# Patient Record
Sex: FEMALE | Race: WHITE | NOT HISPANIC OR LATINO | Employment: FULL TIME | URBAN - METROPOLITAN AREA
[De-identification: names, ages, dates, MRNs, and addresses within clinical notes are randomized per-mention and may not be internally consistent; named-entity substitution may affect disease eponyms.]

---

## 2018-06-25 DIAGNOSIS — J32.0 MAXILLARY SINUSITIS, UNSPECIFIED CHRONICITY: Primary | ICD-10-CM

## 2018-06-25 DIAGNOSIS — R05.9 COUGH: ICD-10-CM

## 2018-06-25 RX ORDER — AMOXICILLIN 875 MG/1
875 TABLET, COATED ORAL 2 TIMES DAILY
Qty: 20 TABLET | Refills: 0 | Status: SHIPPED | OUTPATIENT
Start: 2018-06-25 | End: 2018-07-05

## 2018-06-25 RX ORDER — BENZONATATE 200 MG/1
200 CAPSULE ORAL 3 TIMES DAILY PRN
Qty: 20 CAPSULE | Refills: 0 | Status: SHIPPED | OUTPATIENT
Start: 2018-06-25 | End: 2019-07-23

## 2018-06-25 RX ORDER — PROMETHAZINE HYDROCHLORIDE AND CODEINE PHOSPHATE 6.25; 1 MG/5ML; MG/5ML
5 SYRUP ORAL EVERY 4 HOURS PRN
Qty: 100 ML | Refills: 0 | Status: SHIPPED | OUTPATIENT
Start: 2018-06-25 | End: 2019-07-23

## 2019-07-23 ENCOUNTER — OFFICE VISIT (OUTPATIENT)
Dept: FAMILY MEDICINE CLINIC | Facility: CLINIC | Age: 54
End: 2019-07-23
Payer: COMMERCIAL

## 2019-07-23 VITALS
HEART RATE: 84 BPM | TEMPERATURE: 97.7 F | RESPIRATION RATE: 16 BRPM | WEIGHT: 192 LBS | BODY MASS INDEX: 34.02 KG/M2 | SYSTOLIC BLOOD PRESSURE: 134 MMHG | DIASTOLIC BLOOD PRESSURE: 90 MMHG | HEIGHT: 63 IN

## 2019-07-23 DIAGNOSIS — R05.9 COUGH: Primary | ICD-10-CM

## 2019-07-23 PROCEDURE — 1036F TOBACCO NON-USER: CPT | Performed by: FAMILY MEDICINE

## 2019-07-23 PROCEDURE — 3008F BODY MASS INDEX DOCD: CPT | Performed by: FAMILY MEDICINE

## 2019-07-23 PROCEDURE — 99202 OFFICE O/P NEW SF 15 MIN: CPT | Performed by: FAMILY MEDICINE

## 2019-07-23 NOTE — PROGRESS NOTES
Chief Complaint   Patient presents with    URI        Patient ID: Maricel Morillo is a 48 y o  female  HPI  Pt is seeing for residual cough x 3 wks -  started with URI symptoms missed 3 days of work initially - tried Sakti3 meds with minimal help, no Sob or wheezing, no fever     The following portions of the patient's history were reviewed and updated as appropriate: allergies, current medications, past family history, past medical history, past social history, past surgical history and problem list     Review of Systems   Constitutional: Negative  HENT: Negative  Respiratory: Positive for cough  Negative for chest tightness, shortness of breath and wheezing  Cardiovascular: Negative  Gastrointestinal: Negative  Genitourinary: Negative  No current outpatient medications on file  No current facility-administered medications for this visit  Objective:    /90 (BP Location: Right arm, Patient Position: Sitting, Cuff Size: Large)   Pulse 84   Temp 97 7 °F (36 5 °C) (Tympanic)   Resp 16   Ht 5' 3" (1 6 m)   Wt 87 1 kg (192 lb)   BMI 34 01 kg/m²        Physical Exam   Constitutional: No distress  Obese    HENT:   Mouth/Throat: No oropharyngeal exudate  Eyes: Conjunctivae are normal    Cardiovascular: Normal rate  Pulmonary/Chest: Effort normal and breath sounds normal  No respiratory distress  She has no wheezes  She has no rales                 Assessment/Plan:         Diagnoses and all orders for this visit:    Cough      improving    rto for CPE                       Radha Desai MD

## 2019-07-23 NOTE — LETTER
July 23, 2019     Patient: Yanely Singh   YOB: 1965   Date of Visit: 7/23/2019       To Whom it May Concern:    Yanely Singh is under my professional care  She was seen in my office on 7/23/2019  Patient was out  Work from 7/1/2019 to 7/3/2019 for the same reason  If you have any questions or concerns, please don't hesitate to call           Sincerely,          Patrick Torrez MD        CC: No Recipients

## 2019-10-17 DIAGNOSIS — R03.0 ELEVATED BLOOD PRESSURE READING: Primary | ICD-10-CM

## 2019-10-17 RX ORDER — TRIAMTERENE AND HYDROCHLOROTHIAZIDE 37.5; 25 MG/1; MG/1
1 CAPSULE ORAL EVERY MORNING
Qty: 30 CAPSULE | Refills: 0 | Status: SHIPPED | OUTPATIENT
Start: 2019-10-17 | End: 2019-11-08 | Stop reason: SDUPTHER

## 2019-11-08 ENCOUNTER — OFFICE VISIT (OUTPATIENT)
Dept: FAMILY MEDICINE CLINIC | Facility: CLINIC | Age: 54
End: 2019-11-08
Payer: COMMERCIAL

## 2019-11-08 VITALS
WEIGHT: 196.8 LBS | BODY MASS INDEX: 36.22 KG/M2 | HEIGHT: 62 IN | TEMPERATURE: 97.8 F | SYSTOLIC BLOOD PRESSURE: 138 MMHG | HEART RATE: 80 BPM | DIASTOLIC BLOOD PRESSURE: 92 MMHG | RESPIRATION RATE: 18 BRPM
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
## 2019-11-08 DIAGNOSIS — Z11.59 NEED FOR HEPATITIS C SCREENING TEST: ICD-10-CM

## 2019-11-08 DIAGNOSIS — R03.0 ELEVATED BLOOD PRESSURE READING: ICD-10-CM

## 2019-11-08 DIAGNOSIS — B36.9 FUNGAL DERMATITIS: ICD-10-CM

## 2019-11-08 DIAGNOSIS — E78.5 BORDERLINE HYPERLIPIDEMIA: ICD-10-CM

## 2019-11-08 DIAGNOSIS — Z00.00 PHYSICAL EXAM: Primary | ICD-10-CM

## 2019-11-08 LAB
SL AMB  POCT GLUCOSE, UA: NORMAL
SL AMB LEUKOCYTE ESTERASE,UA: NORMAL
SL AMB POCT BILIRUBIN,UA: NORMAL
SL AMB POCT BLOOD,UA: NORMAL
SL AMB POCT CLARITY,UA: NORMAL
SL AMB POCT COLOR,UA: YELLOW
SL AMB POCT HEMOGLOBIN AIC: 5.3 (ref ?–6.5)
SL AMB POCT KETONES,UA: NORMAL
SL AMB POCT NITRITE,UA: NORMAL
SL AMB POCT PH,UA: 6.5
SL AMB POCT SPECIFIC GRAVITY,UA: 1
SL AMB POCT URINE PROTEIN: NORMAL
SL AMB POCT UROBILINOGEN: NORMAL

## 2019-11-08 PROCEDURE — 99396 PREV VISIT EST AGE 40-64: CPT | Performed by: FAMILY MEDICINE

## 2019-11-08 PROCEDURE — 81003 URINALYSIS AUTO W/O SCOPE: CPT | Performed by: FAMILY MEDICINE

## 2019-11-08 PROCEDURE — 3044F HG A1C LEVEL LT 7.0%: CPT | Performed by: FAMILY MEDICINE

## 2019-11-08 PROCEDURE — 83036 HEMOGLOBIN GLYCOSYLATED A1C: CPT | Performed by: FAMILY MEDICINE

## 2019-11-08 RX ORDER — TRIAMTERENE AND HYDROCHLOROTHIAZIDE 37.5; 25 MG/1; MG/1
1 CAPSULE ORAL EVERY MORNING
Qty: 30 CAPSULE | Refills: 0 | Status: SHIPPED | OUTPATIENT
Start: 2019-11-08 | End: 2019-11-30 | Stop reason: SDUPTHER

## 2019-11-08 RX ORDER — CLOTRIMAZOLE AND BETAMETHASONE DIPROPIONATE 10; .64 MG/G; MG/G
CREAM TOPICAL 2 TIMES DAILY
Qty: 30 G | Refills: 1 | Status: SHIPPED | OUTPATIENT
Start: 2019-11-08 | End: 2020-06-17 | Stop reason: ALTCHOICE

## 2019-11-08 NOTE — PATIENT INSTRUCTIONS
Hypertension   WHAT YOU NEED TO KNOW:   Hypertension is high blood pressure (BP)  Your BP is the force of your blood moving against the walls of your arteries  Normal BP is less than 120/80  Prehypertension is between 120/80 and 139/89  Hypertension is 140/90 or higher  Hypertension causes your BP to get so high that your heart has to work much harder than normal  This can damage your heart  You can control hypertension with a healthy lifestyle or medicines  A controlled blood pressure helps protect your organs, such as your heart, lungs, brain, and kidneys  DISCHARGE INSTRUCTIONS:   Call 911 for any of the following:   · You have discomfort in your chest that feels like squeezing, pressure, fullness, or pain  · You become confused or have difficulty speaking  · You suddenly feel lightheaded or have trouble breathing  · You have pain or discomfort in your back, neck, jaw, stomach, or arm  Return to the emergency department if:   · You have a severe headache or vision loss  · You have weakness in an arm or leg  Contact your healthcare provider if:   · You feel faint, dizzy, confused, or drowsy  · You have been taking your BP medicine and your BP is still higher than your healthcare provider says it should be  · You have questions or concerns about your condition or care  Medicines: You may  need any of the following:  · Medicine  may be used to help lower your BP  You may need more than one type of medicine  Take the medicine exactly as directed  · Diuretics  help decrease extra fluid that collects in your body  This will help lower your BP  You may urinate more often while you take this medicine  · Cholesterol medicine  helps lower your cholesterol level  A low cholesterol level helps prevent heart disease and makes it easier to control your blood pressure  · Take your medicine as directed    Contact your healthcare provider if you think your medicine is not helping or if you have side effects  Tell him or her if you are allergic to any medicine  Keep a list of the medicines, vitamins, and herbs you take  Include the amounts, and when and why you take them  Bring the list or the pill bottles to follow-up visits  Carry your medicine list with you in case of an emergency  Follow up with your healthcare provider as directed: You will need to return to have your BP checked and to have other lab tests done  Write down your questions so you remember to ask them during your visits  Manage hypertension:  Talk with your healthcare provider about these and other ways to manage hypertension:  · Check your BP at home  Sit and rest for 5 minutes before you take your BP  Extend your arm and support it on a flat surface  Your arm should be at the same level as your heart  Follow the directions that came with your BP monitor  If possible, take at least 2 BP readings each time  Take your BP at least twice a day at the same times each day, such as morning and evening  Keep a record of your BP readings and bring it to your follow-up visits  Ask your healthcare provider what your BP should be  · Limit sodium (salt) as directed  Too much sodium can affect your fluid balance  Check labels to find low-sodium or no-salt-added foods  Some low-sodium foods use potassium salts for flavor  Too much potassium can also cause health problems  Your healthcare provider will tell you how much sodium and potassium are safe for you to have in a day  He or she may recommend that you limit sodium to 2,300 mg a day  · Follow the meal plan recommended by your healthcare provider  A dietitian or your provider can give you more information on low-sodium plans or the DASH (Dietary Approaches to Stop Hypertension) eating plan  The DASH plan is low in sodium, unhealthy fats, and total fat  It is high in potassium, calcium, and fiber  · Exercise to maintain a healthy weight    Exercise at least 30 minutes per day, on most days of the week  This will help decrease your blood pressure  Ask your healthcare provider about the best exercise plan for you  · Decrease stress  This may help lower your BP  Learn ways to relax, such as deep breathing or listening to music  · Limit alcohol  Women should limit alcohol to 1 drink a day  Men should limit alcohol to 2 drinks a day  A drink of alcohol is 12 ounces of beer, 5 ounces of wine, or 1½ ounces of liquor  · Do not smoke  Nicotine and other chemicals in cigarettes and cigars can increase your BP and also cause lung damage  Ask your healthcare provider for information if you currently smoke and need help to quit  E-cigarettes or smokeless tobacco still contain nicotine  Talk to your healthcare provider before you use these products  · Manage any other health conditions you have  Health conditions such as diabetes can increase your risk for hypertension  Follow your healthcare provider's instructions and take all your medicines as directed  © 2017 [employer] Information is for End User's use only and may not be sold, redistributed or otherwise used for commercial purposes  All illustrations and images included in CareNotes® are the copyrighted property of [employer] , [employer]  or [employer]  The above information is an  only  It is not intended as medical advice for individual conditions or treatments  Talk to your doctor, nurse or pharmacist before following any medical regimen to see if it is safe and effective for you

## 2019-11-10 NOTE — ASSESSMENT & PLAN NOTE
BMI Counseling: Body mass index is 36 kg/m²  Discussed the patient's BMI with her  The BMI is above normal  Nutrition recommendations include reducing portion sizes, decreasing overall calorie intake, 3-5 servings of fruits/vegetables daily, reducing fast food intake, consuming healthier snacks and decreasing soda and/or juice intake  Exercise recommendations include exercising 3-5 times per week and strength training exercises

## 2019-11-10 NOTE — PROGRESS NOTES
[facility] HEALTH MAINTENANCE OFFICE VISIT  [facility] - [facility]    NAME: Ash Hernandez  AGE: 47 y o  SEX: female  : 1965     DATE: 11/10/2019    Assessment and Plan     1  Physical exam  -     POCT urine dip auto non-scope  -     Amylase; Future  -     Lipase; Future  -     Lipid Panel with Direct LDL reflex; Future  -     CBC; Future  -     Comprehensive metabolic panel; Future  -     Hepatitis C antibody; Future  -     Magnesium; Future  -     TSH, 3rd generation; Future  -     Lyme Antibody Profile with reflex to WB; Future  -     Babesia microti antibody, IgG & Igm; Future  -     POCT hemoglobin A1c    2  Elevated blood pressure reading  Comments:  rx Dyazide, low sodium diet, limit caffeine/nsaid and decongestant use  check labs, sched yearly eye exam,cont t monitor  Orders:  -     POCT urine dip auto non-scope  -     triamterene-hydrochlorothiazide (DYAZIDE) 37 5-25 mg per capsule; Take 1 capsule by mouth every morning  -     Amylase; Future  -     Lipase; Future  -     CBC; Future  -     Comprehensive metabolic panel; Future  -     Magnesium; Future  -     POCT hemoglobin A1c    3  Borderline hyperlipidemia  Comments:  check lipid panel and thyroid fxn, maintain low chol diet  Orders:  -     Amylase; Future  -     Lipase; Future  -     Lipid Panel with Direct LDL reflex; Future  -     CBC; Future  -     Comprehensive metabolic panel; Future  -     TSH, 3rd generation; Future    4  Need for hepatitis C screening test  Comments:  check hep c ab  Orders:  -     Hepatitis C antibody; Future    5  Tick bite, sequela  Comments:  check Lyme and Babesia titers  Orders:  -     Lyme Antibody Profile with reflex to WB; Future  -     Babesia microti antibody, IgG & Igm; Future    6   Fungal dermatitis  Comments:  rx Lotrisone cream, otc Goldbonds powder--call if nystatin powder needed  Orders:  -     clotrimazole-betamethasone (LOTRISONE) 1-0 05 % cream; Apply topically 2 (two) times a day    7  BMI 36 0-36 9,adult  Assessment & Plan:  BMI Counseling: Body mass index is 36 kg/m²  Discussed the patient's BMI with her  The BMI is above normal  Nutrition recommendations include reducing portion sizes, decreasing overall calorie intake, 3-5 servings of fruits/vegetables daily, reducing fast food intake, consuming healthier snacks and decreasing soda and/or juice intake  Exercise recommendations include exercising 3-5 times per week and strength training exercises  · Patient Counseling:   · Nutrition: Stressed importance of a well balanced diet, moderation of sodium/saturated fat, caloric balance and sufficient intake of fiber  · Exercise: Stressed the importance of regular exercise with a goal of 150 minutes per week  · Dental Health: Discussed daily flossing and brushing and regular dental visits   · Injury Prevention: Discussed Safety Belts, Safety Helmets, and Smoke Detectors    · Immunizations reviewed: Risks and Benefits discussed  · Discussed benefits of:  Colon Cancer Screening, Mammogram , Cervical Cancer screening and Screening labs   BMI Counseling: Body mass index is 36 kg/m²  Discussed with patient's BMI with her  The BMI is above normal  Nutrition recommendations include reducing portion sizes, decreasing overall calorie intake, 3-5 servings of fruits/vegetables daily, reducing fast food intake, consuming healthier snacks, decreasing soda and/or juice intake, moderation in carbohydrate intake and increasing intake of lean protein  Exercise recommendations include exercising 3-5 times per week and strength training exercises  No follow-ups on file          Chief Complaint     Chief Complaint   Patient presents with    Physical Exam       History of Present Illness     HPI    Well Adult Physical   Patient here for a comprehensive physical exam       Diet and Physical Activity  Diet: well balanced diet  Exercise: several times per week Depression Screen  PHQ-9 Depression Screening    PHQ-9:    Frequency of the following problems over the past two weeks:               General Health  Hearing: Normal:  bilateral  Vision: wears glasses  Dental: regular dental visits    Reproductive Health  Follows with gynecologist  Dr Luis dias breast u/s wnl 2019;ania utd-wnl    The following portions of the patient's history were reviewed and updated as appropriate: allergies, current medications, past family history, past medical history, past social history, past surgical history and problem list     Review of Systems     Review of Systems   Constitutional: Positive for fatigue  Negative for fever  Eyes: Positive for visual disturbance  Respiratory: Negative  Cardiovascular: Negative  Gastrointestinal: Negative  Musculoskeletal: Positive for arthralgias and myalgias  Skin: Positive for rash  Allergic/Immunologic: Positive for environmental allergies  Neurological: Positive for headaches  Psychiatric/Behavioral: Positive for sleep disturbance  The patient is nervous/anxious  Past Medical History     History reviewed  No pertinent past medical history      Past Surgical History     Past Surgical History:   Procedure Laterality Date    ANTERIOR CRUCIATE LIGAMENT REPAIR       SECTION         Social History     Social History     Socioeconomic History    Marital status: /Civil Union     Spouse name: None    Number of children: None    Years of education: None    Highest education level: None   Occupational History    None   Social Needs    Financial resource strain: None    Food insecurity:     Worry: None     Inability: None    Transportation needs:     Medical: None     Non-medical: None   Tobacco Use    Smoking status: Never Smoker    Smokeless tobacco: Never Used   Substance and Sexual Activity    Alcohol use: Yes     Comment: occasional    Drug use: Never    Sexual activity: Yes Partners: Male   Lifestyle    Physical activity:     Days per week: None     Minutes per session: None    Stress: None   Relationships    Social connections:     Talks on phone: None     Gets together: None     Attends Zoroastrianism service: None     Active member of club or organization: None     Attends meetings of clubs or organizations: None     Relationship status: None    Intimate partner violence:     Fear of current or ex partner: None     Emotionally abused: None     Physically abused: None     Forced sexual activity: None   Other Topics Concern    None   Social History Narrative    None       Family History     Family History   Problem Relation Age of Onset    Heart disease Father     Diabetes Sister     Diabetes Brother        Current Medications       Current Outpatient Medications:     triamterene-hydrochlorothiazide (DYAZIDE) 37 5-25 mg per capsule, Take 1 capsule by mouth every morning, Disp: 30 capsule, Rfl: 0    clotrimazole-betamethasone (LOTRISONE) 1-0 05 % cream, Apply topically 2 (two) times a day, Disp: 30 g, Rfl: 1     Allergies     No Known Allergies    Objective     /92 (BP Location: Left arm, Patient Position: Sitting, Cuff Size: Large)   Pulse 80   Temp 97 8 °F (36 6 °C)   Resp 18   Ht 5' 2" (1 575 m)   Wt 89 3 kg (196 lb 12 8 oz)   BMI 36 00 kg/m²      Physical Exam   Constitutional: She is oriented to person, place, and time  OW, NAD   HENT:   Head: Normocephalic and atraumatic  Nose: Nose normal    Mouth/Throat: No oropharyngeal exudate  Eyes: Conjunctivae are normal  No scleral icterus  Neck: Neck supple  Cardiovascular: Normal rate, regular rhythm, normal heart sounds and intact distal pulses  Pulmonary/Chest: Effort normal and breath sounds normal  No respiratory distress  Abdominal: Soft  Bowel sounds are normal  There is no tenderness  Musculoskeletal: Normal range of motion     Neurological: She is alert and oriented to person, place, and time  A cranial nerve deficit is present  Skin: Skin is warm and dry  Capillary refill takes less than 2 seconds  Rash (below breasts-red,moist, papular) noted  Psychiatric: She has a normal mood and affect  Her behavior is normal  Judgment and thought content normal    Nursing note and vitals reviewed           Visual Acuity Screening    Right eye Left eye Both eyes   Without correction:      With correction: 25/25 20/50 20/25           Dara Ya MD  [address]

## 2019-11-13 LAB
ALBUMIN SERPL-MCNC: 4.5 G/DL (ref 3.5–5.5)
ALBUMIN/GLOB SERPL: 1.6 {RATIO} (ref 1.2–2.2)
ALP SERPL-CCNC: 67 IU/L (ref 39–117)
ALT SERPL-CCNC: 61 IU/L (ref 0–32)
AMYLASE SERPL-CCNC: 37 U/L (ref 31–124)
AST SERPL-CCNC: 43 IU/L (ref 0–40)
B BURGDOR IGG+IGM SER-ACNC: <0.91 ISR (ref 0–0.9)
B MICROTI IGG TITR SER: NORMAL {TITER}
B MICROTI IGM TITR SER: NORMAL {TITER}
BASOPHILS # BLD AUTO: 0 X10E3/UL (ref 0–0.2)
BASOPHILS NFR BLD AUTO: 1 %
BILIRUB SERPL-MCNC: 0.4 MG/DL (ref 0–1.2)
BUN SERPL-MCNC: 21 MG/DL (ref 6–24)
BUN/CREAT SERPL: 25 (ref 9–23)
CALCIUM SERPL-MCNC: 9.5 MG/DL (ref 8.7–10.2)
CHLORIDE SERPL-SCNC: 99 MMOL/L (ref 96–106)
CHOLEST SERPL-MCNC: 276 MG/DL (ref 100–199)
CO2 SERPL-SCNC: 22 MMOL/L (ref 20–29)
CREAT SERPL-MCNC: 0.84 MG/DL (ref 0.57–1)
EOSINOPHIL # BLD AUTO: 0.1 X10E3/UL (ref 0–0.4)
EOSINOPHIL NFR BLD AUTO: 3 %
ERYTHROCYTE [DISTWIDTH] IN BLOOD BY AUTOMATED COUNT: 13.2 % (ref 12.3–15.4)
GLOBULIN SER-MCNC: 2.8 G/DL (ref 1.5–4.5)
GLUCOSE SERPL-MCNC: 100 MG/DL (ref 65–99)
HCT VFR BLD AUTO: 44.1 % (ref 34–46.6)
HCV AB S/CO SERPL IA: <0.1 S/CO RATIO (ref 0–0.9)
HDLC SERPL-MCNC: 60 MG/DL
HGB BLD-MCNC: 15 G/DL (ref 11.1–15.9)
IMM GRANULOCYTES # BLD: 0 X10E3/UL (ref 0–0.1)
IMM GRANULOCYTES NFR BLD: 0 %
LDLC SERPL CALC-MCNC: 188 MG/DL (ref 0–99)
LIPASE SERPL-CCNC: 22 U/L (ref 14–72)
LYMPHOCYTES # BLD AUTO: 2 X10E3/UL (ref 0.7–3.1)
LYMPHOCYTES NFR BLD AUTO: 44 %
MAGNESIUM SERPL-MCNC: 2.2 MG/DL (ref 1.6–2.3)
MCH RBC QN AUTO: 29.6 PG (ref 26.6–33)
MCHC RBC AUTO-ENTMCNC: 34 G/DL (ref 31.5–35.7)
MCV RBC AUTO: 87 FL (ref 79–97)
MICRODELETION SYND BLD/T FISH: NORMAL
MONOCYTES # BLD AUTO: 0.3 X10E3/UL (ref 0.1–0.9)
MONOCYTES NFR BLD AUTO: 7 %
NEUTROPHILS # BLD AUTO: 2 X10E3/UL (ref 1.4–7)
NEUTROPHILS NFR BLD AUTO: 45 %
PLATELET # BLD AUTO: 279 X10E3/UL (ref 150–450)
POTASSIUM SERPL-SCNC: 4.1 MMOL/L (ref 3.5–5.2)
PROT SERPL-MCNC: 7.3 G/DL (ref 6–8.5)
RBC # BLD AUTO: 5.07 X10E6/UL (ref 3.77–5.28)
SL AMB EGFR AFRICAN AMERICAN: 91 ML/MIN/1.73
SL AMB EGFR NON AFRICAN AMERICAN: 79 ML/MIN/1.73
SODIUM SERPL-SCNC: 138 MMOL/L (ref 134–144)
TRIGL SERPL-MCNC: 142 MG/DL (ref 0–149)
TSH SERPL DL<=0.005 MIU/L-ACNC: 3.11 UIU/ML (ref 0.45–4.5)
WBC # BLD AUTO: 4.5 X10E3/UL (ref 3.4–10.8)

## 2019-11-16 DIAGNOSIS — E78.01 FAMILIAL HYPERCHOLESTEROLEMIA: ICD-10-CM

## 2019-11-16 DIAGNOSIS — R03.0 ELEVATED BLOOD PRESSURE READING: Primary | ICD-10-CM

## 2019-11-16 DIAGNOSIS — R94.5 ABNORMAL RESULTS OF LIVER FUNCTION STUDIES: ICD-10-CM

## 2019-11-21 DIAGNOSIS — J40 BRONCHITIS: Primary | ICD-10-CM

## 2019-11-21 DIAGNOSIS — J01.00 ACUTE MAXILLARY SINUSITIS, RECURRENCE NOT SPECIFIED: ICD-10-CM

## 2019-11-21 RX ORDER — BENZONATATE 200 MG/1
200 CAPSULE ORAL 3 TIMES DAILY PRN
Qty: 15 CAPSULE | Refills: 0 | Status: SHIPPED | OUTPATIENT
Start: 2019-11-21 | End: 2019-11-26

## 2019-11-21 RX ORDER — AMOXICILLIN AND CLAVULANATE POTASSIUM 875; 125 MG/1; MG/1
1 TABLET, FILM COATED ORAL EVERY 12 HOURS SCHEDULED
Qty: 14 TABLET | Refills: 0 | Status: SHIPPED | OUTPATIENT
Start: 2019-11-21 | End: 2019-11-28

## 2019-11-25 ENCOUNTER — OFFICE VISIT (OUTPATIENT)
Dept: FAMILY MEDICINE CLINIC | Facility: CLINIC | Age: 54
End: 2019-11-25
Payer: COMMERCIAL

## 2019-11-25 ENCOUNTER — APPOINTMENT (OUTPATIENT)
Dept: LAB | Facility: CLINIC | Age: 54
End: 2019-11-25
Payer: COMMERCIAL

## 2019-11-25 ENCOUNTER — APPOINTMENT (OUTPATIENT)
Dept: RADIOLOGY | Facility: CLINIC | Age: 54
End: 2019-11-25
Payer: COMMERCIAL

## 2019-11-25 VITALS
OXYGEN SATURATION: 96 % | DIASTOLIC BLOOD PRESSURE: 84 MMHG | RESPIRATION RATE: 18 BRPM | HEIGHT: 62 IN | SYSTOLIC BLOOD PRESSURE: 126 MMHG | WEIGHT: 190.8 LBS | HEART RATE: 82 BPM | TEMPERATURE: 98.6 F | BODY MASS INDEX: 35.11 KG/M2

## 2019-11-25 DIAGNOSIS — R94.5 ABNORMAL RESULTS OF LIVER FUNCTION STUDIES: ICD-10-CM

## 2019-11-25 DIAGNOSIS — R53.82 CHRONIC FATIGUE: ICD-10-CM

## 2019-11-25 DIAGNOSIS — Z57.5 OCCUPATIONAL EXPOSURE TO INDUSTRIAL TOXINS: ICD-10-CM

## 2019-11-25 DIAGNOSIS — R94.5 ABNORMAL RESULTS OF LIVER FUNCTION STUDIES: Primary | ICD-10-CM

## 2019-11-25 DIAGNOSIS — E78.5 HYPERLIPIDEMIA, UNSPECIFIED HYPERLIPIDEMIA TYPE: ICD-10-CM

## 2019-11-25 DIAGNOSIS — J18.9 PNEUMONIA, UNSPECIFIED ORGANISM: Primary | ICD-10-CM

## 2019-11-25 DIAGNOSIS — R05.9 COUGH: ICD-10-CM

## 2019-11-25 DIAGNOSIS — R03.0 ELEVATED BLOOD PRESSURE READING: ICD-10-CM

## 2019-11-25 PROCEDURE — 71046 X-RAY EXAM CHEST 2 VIEWS: CPT

## 2019-11-25 PROCEDURE — 99214 OFFICE O/P EST MOD 30 MIN: CPT | Performed by: FAMILY MEDICINE

## 2019-11-25 RX ORDER — LEVOFLOXACIN 500 MG/1
500 TABLET, FILM COATED ORAL EVERY 24 HOURS
Qty: 7 TABLET | Refills: 0 | Status: SHIPPED | OUTPATIENT
Start: 2019-11-25 | End: 2019-12-02

## 2019-11-25 SDOH — HEALTH STABILITY - PHYSICAL HEALTH: OCCUPATIONAL EXPOSURE TO TOXIC AGENTS IN OTHER INDUSTRIES: Z57.5

## 2019-11-26 ENCOUNTER — VBI (OUTPATIENT)
Dept: ADMINISTRATIVE | Facility: OTHER | Age: 54
End: 2019-11-26

## 2019-11-30 DIAGNOSIS — R03.0 ELEVATED BLOOD PRESSURE READING: ICD-10-CM

## 2019-11-30 LAB
BACTERIA SPT CULT: NORMAL
GRAM STN SPT: NORMAL
Lab: NORMAL
SQUAMOUS SPT QL MICRO: NORMAL
WBC SPT QL MICRO: NORMAL

## 2019-11-30 RX ORDER — TRIAMTERENE AND HYDROCHLOROTHIAZIDE 37.5; 25 MG/1; MG/1
1 CAPSULE ORAL EVERY MORNING
Qty: 90 CAPSULE | Refills: 1 | Status: SHIPPED | OUTPATIENT
Start: 2019-11-30 | End: 2020-06-17 | Stop reason: SDUPTHER

## 2019-12-02 PROBLEM — R94.5 ABNORMAL RESULTS OF LIVER FUNCTION STUDIES: Status: ACTIVE | Noted: 2019-12-02

## 2019-12-02 PROBLEM — R53.82 CHRONIC FATIGUE: Status: ACTIVE | Noted: 2019-12-02

## 2019-12-02 PROBLEM — J18.9 PNEUMONIA, UNSPECIFIED ORGANISM: Status: ACTIVE | Noted: 2019-12-02

## 2019-12-02 PROBLEM — Z57.5: Status: ACTIVE | Noted: 2019-12-02

## 2019-12-03 ENCOUNTER — APPOINTMENT (OUTPATIENT)
Dept: RADIOLOGY | Facility: CLINIC | Age: 54
End: 2019-12-03
Payer: COMMERCIAL

## 2019-12-03 ENCOUNTER — OFFICE VISIT (OUTPATIENT)
Dept: FAMILY MEDICINE CLINIC | Facility: CLINIC | Age: 54
End: 2019-12-03
Payer: COMMERCIAL

## 2019-12-03 ENCOUNTER — VBI (OUTPATIENT)
Dept: FAMILY MEDICINE CLINIC | Facility: CLINIC | Age: 54
End: 2019-12-03

## 2019-12-03 VITALS
TEMPERATURE: 98 F | SYSTOLIC BLOOD PRESSURE: 136 MMHG | WEIGHT: 194 LBS | HEART RATE: 64 BPM | DIASTOLIC BLOOD PRESSURE: 82 MMHG | BODY MASS INDEX: 35.7 KG/M2 | RESPIRATION RATE: 16 BRPM | HEIGHT: 62 IN | OXYGEN SATURATION: 97 %

## 2019-12-03 DIAGNOSIS — R53.82 CHRONIC FATIGUE: ICD-10-CM

## 2019-12-03 DIAGNOSIS — R94.5 ABNORMAL RESULTS OF LIVER FUNCTION STUDIES: ICD-10-CM

## 2019-12-03 DIAGNOSIS — J18.9 PNEUMONIA, UNSPECIFIED ORGANISM: ICD-10-CM

## 2019-12-03 DIAGNOSIS — R79.0 ABNORMAL BLOOD LEVEL OF COPPER: ICD-10-CM

## 2019-12-03 DIAGNOSIS — J18.9 PNEUMONIA, UNSPECIFIED ORGANISM: Primary | ICD-10-CM

## 2019-12-03 LAB
ANTIMONY BLD-MCNC: 4.4 MCG/L
ARSENIC BLD-MCNC: 5 UG/L (ref 2–23)
BASOPHILS # BLD AUTO: 0 X10E3/UL (ref 0–0.2)
BASOPHILS NFR BLD AUTO: 0 %
BISMUTH [MASS/VOLUME] IN BLOOD: NORMAL UG/L (ref 0–9)
COBALT SERPL-MCNC: NORMAL UG/L (ref 0–0.9)
COPPER SERPL-MCNC: 179 UG/DL (ref 72–166)
CR RBC-MCNC: NORMAL MCG/L
CR SERPL-MCNC: 1 UG/L (ref 0.1–2.1)
EOSINOPHIL # BLD AUTO: 0 X10E3/UL (ref 0–0.4)
EOSINOPHIL NFR BLD AUTO: 0 %
ERYTHROCYTE [DISTWIDTH] IN BLOOD BY AUTOMATED COUNT: 13.4 % (ref 12.3–15.4)
FORMALDEHYDE IGE AB [UNITS/VOLUME] IN SERUM: <0.1 KU/L
HCT VFR BLD AUTO: 39.4 % (ref 34–46.6)
HGB BLD-MCNC: 13.1 G/DL (ref 11.1–15.9)
IMM GRANULOCYTES # BLD: 0 X10E3/UL (ref 0–0.1)
IMM GRANULOCYTES NFR BLD: 0 %
LEAD BLD-MCNC: NORMAL UG/DL (ref 0–4)
LYMPHOCYTES # BLD AUTO: 1.5 X10E3/UL (ref 0.7–3.1)
LYMPHOCYTES NFR BLD AUTO: 20 %
MCH RBC QN AUTO: 28.4 PG (ref 26.6–33)
MCHC RBC AUTO-ENTMCNC: 33.2 G/DL (ref 31.5–35.7)
MCV RBC AUTO: 85 FL (ref 79–97)
MERCURY BLD-MCNC: 2.2 UG/L (ref 0–14.9)
MONOCYTES # BLD AUTO: 0.6 X10E3/UL (ref 0.1–0.9)
MONOCYTES NFR BLD AUTO: 8 %
NEUTROPHILS # BLD AUTO: 5.2 X10E3/UL (ref 1.4–7)
NEUTROPHILS NFR BLD AUTO: 72 %
NICKEL SERPL-MCNC: 4 UG/L (ref 0.6–7.5)
PLATELET # BLD AUTO: 268 X10E3/UL (ref 150–450)
RBC # BLD AUTO: 4.62 X10E6/UL (ref 3.77–5.28)
WBC # BLD AUTO: 7.4 X10E3/UL (ref 3.4–10.8)

## 2019-12-03 PROCEDURE — 71046 X-RAY EXAM CHEST 2 VIEWS: CPT

## 2019-12-03 PROCEDURE — 99214 OFFICE O/P EST MOD 30 MIN: CPT | Performed by: FAMILY MEDICINE

## 2019-12-03 NOTE — PROGRESS NOTES
Chief Complaint   Patient presents with    URI     for past week cough ,fever ,weakness , congestion    Follow-up     labs    Blood Pressure Check        Patient ID: Nan Napier is a 47 y o  female  Cough   The problem has been gradually worsening  The cough is productive of sputum  Associated symptoms include a fever, headaches, myalgias, nasal congestion, postnasal drip, rhinorrhea, a sore throat, shortness of breath (w exertion) and wheezing  Pertinent negatives include no chest pain, heartburn, hemoptysis or rash  Risk factors for lung disease include occupational exposure and travel  She has tried rest and OTC cough suppressant for the symptoms  The treatment provided no relief  Her past medical history is significant for bronchitis and environmental allergies  Fatigue   The current episode started 1 to 4 weeks ago  The problem has been gradually worsening  Associated symptoms include arthralgias, congestion, coughing, fatigue, a fever, headaches, myalgias, a sore throat and weakness  Pertinent negatives include no abdominal pain, chest pain, neck pain, rash, urinary symptoms or vomiting  The symptoms are aggravated by exertion and stress  She has tried sleep, rest, NSAIDs and acetaminophen for the symptoms  Recent labs show inc lfts, lipids  Has occ nausea, no vomiting, no change in b/b  No rash or jaundice  pt concerned about possible heavy metal/toxin exposure in  uniform--per pt other [employer] employees w same sx-fatigue, inc lfts  [place]  Pt non-smoker  Social ETOH use, no illicit drug use  History reviewed  No pertinent past medical history      Past Surgical History:   Procedure Laterality Date    ANTERIOR CRUCIATE LIGAMENT REPAIR       SECTION         Patient Active Problem List   Diagnosis    BMI 36 0-36 9,adult    Pneumonia, unspecified organism    Abnormal results of liver function studies    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    Chronic fatigue       Family History   Problem Relation Age of Onset    Heart disease Father     Diabetes Sister     Diabetes Brother        Immunization History   Administered Date(s) Administered    Influenza TIV (IM) 11/14/2008    Tdap 08/20/2013       No Known Allergies    Current Outpatient Medications   Medication Sig Dispense Refill    clotrimazole-betamethasone (LOTRISONE) 1-0 05 % cream Apply topically 2 (two) times a day 30 g 1    levofloxacin (LEVAQUIN) 500 mg tablet Take 1 tablet (500 mg total) by mouth every 24 hours for 7 days 7 tablet 0    triamterene-hydrochlorothiazide (DYAZIDE) 37 5-25 mg per capsule Take 1 capsule by mouth every morning 90 capsule 1     No current facility-administered medications for this visit          Social History     Socioeconomic History    Marital status: /Civil Union     Spouse name: None    Number of children: None    Years of education: None    Highest education level: None   Occupational History    None   Social Needs    Financial resource strain: None    Food insecurity:     Worry: None     Inability: None    Transportation needs:     Medical: None     Non-medical: None   Tobacco Use    Smoking status: Never Smoker    Smokeless tobacco: Never Used   Substance and Sexual Activity    Alcohol use: Yes     Comment: occasional    Drug use: Never    Sexual activity: Yes     Partners: Male   Lifestyle    Physical activity:     Days per week: None     Minutes per session: None    Stress: None   Relationships    Social connections:     Talks on phone: None     Gets together: None     Attends Sikhism service: None     Active member of club or organization: None     Attends meetings of clubs or organizations: None     Relationship status: None    Intimate partner violence:     Fear of current or ex partner: None     Emotionally abused: None     Physically abused: None     Forced sexual activity: None   Other Topics Concern  None   Social History Narrative    None       Review of Systems   Constitutional: Positive for fatigue and fever  HENT: Positive for congestion, postnasal drip, rhinorrhea and sore throat  Respiratory: Positive for cough, shortness of breath (w exertion) and wheezing  Negative for hemoptysis  Cardiovascular: Negative for chest pain, palpitations and leg swelling  Gastrointestinal: Negative for abdominal pain, blood in stool, heartburn and vomiting  Genitourinary: Negative for dysuria  Musculoskeletal: Positive for arthralgias and myalgias  Negative for neck pain  Skin: Negative for rash  Allergic/Immunologic: Positive for environmental allergies  Neurological: Positive for weakness and headaches  Psychiatric/Behavioral: Positive for sleep disturbance  Objective:    /84 (BP Location: Left arm, Patient Position: Sitting, Cuff Size: Large)   Pulse 82   Temp 98 6 °F (37 °C)   Resp 18   Ht 5' 2" (1 575 m)   Wt 86 5 kg (190 lb 12 8 oz)   SpO2 96% Comment: RA  BMI 34 90 kg/m²        Physical Exam   Constitutional: She is oriented to person, place, and time  Acutely ill, looks tired   HENT:   Mouth/Throat: No oropharyngeal exudate  Nasal bogginess, postnasal drip  Mild facial tenderness   Eyes: Conjunctivae are normal  No scleral icterus  Neck: Neck supple  Cardiovascular: Normal rate and regular rhythm  Pulmonary/Chest: Effort normal  No respiratory distress  Faint wheezes on right  Occasional scattered rhonchi  No rales  Abdominal: Soft  Bowel sounds are normal  There is no tenderness  Musculoskeletal: Normal range of motion  Neurological: She is alert and oriented to person, place, and time  No cranial nerve deficit  Skin: Skin is warm and dry  Capillary refill takes less than 2 seconds  No rash noted  Psychiatric: She has a normal mood and affect  Nursing note and vitals reviewed            Assessment/Plan:     Diagnoses and all orders for this visit:    Pneumonia, unspecified organism  Comments:  Results of stat chest x-ray showed right middle and upper lobe pneumonia  Pt informed upon receipt of result-will check CBC and sputum culture  Orders:  -     CBC and differential; Future  -     Sputum culture and Gram stain; Future  -     levofloxacin (LEVAQUIN) 500 mg tablet; Take 1 tablet (500 mg total) by mouth every 24 hours for 7 days    Chronic fatigue  Comments:  Treat pneumonia as above  Check additional labs as listed due to possible exposure to toxins in work uniform  Orders:  -     XR chest pa & lateral; Future  -     Heavy metals, blood; Future  -     [place]; Future  -     Copper, Blood; Future  -     Cancel: Antimony, Serum/Plasma; Future    Abnormal results of liver function studies  Comments:  check abd u/s-?fatty liver r/t elevated lipids vs other-will  check addtl labs listed--?r/t heavy metal exposure  Orders:  -     Heavy metals, blood; Future  -     [place]; Future  -     Copper, Blood; Future  -     Cancel: Antimony, Serum/Plasma; Future    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

## 2019-12-03 NOTE — ASSESSMENT & PLAN NOTE
BMI Counseling: Body mass index is 34 9 kg/m²  Discussed the patient's BMI with her  The BMI is above normal  Nutrition recommendations include decreasing overall calorie intake and 3-5 servings of fruits/vegetables daily  Exercise recommendations include exercising 3-5 times per week

## 2019-12-03 NOTE — TELEPHONE ENCOUNTER
[address] in [address] colonoscopy results 3 years ago  tc/cma          3/29/2017 colonoscopy on file in chart  12/03/2019 11:18 AM Phone (Outgoing) Dr Jose De Jesus Zurita (Provider) [phone] Remove   Missing or Invalid Number - missing or invalid? By Roberto Cerna MA               12/03/2019 11:20 AM Phone Lyn Galindo) Galo Fletcher at Dr Armando DE LA ROSA (Provider) [phone] Remove   She states practice is now [facility], call [phone] for Dr Carmen rOtiz location         By Roberto Cerna MA        12/03/2019 11:28 AM Phone Lyn Galindo) Shaina Smart at Dr Pam Morales (Provider) [phone] Remove   Call Complete - She stated to fax medical record requests to [phone]      By Roberto Cerna MA        Faxed to [facility] [phone] Davis Mccann 12/03/19 11:30 AM     Received 3/29/17 colonoscopy report that is on file in chart Davis Mccann 12/05/19 9:24 AM

## 2019-12-03 NOTE — LETTER
Procedure Request Form: Colonoscopy      Date Requested: 19  Patient: Deepa Music  Patient : 1965   Referring Provider: Larena Breath, MD        Date of Procedure ______________________________       The above patient has informed us that they have completed their   most recent Colonoscopy at your facility  Please complete   this form and attach all corresponding procedure reports/results  Comments __________________________________________________________  ____________________________________________________________________  ____________________________________________________________________  ____________________________________________________________________    Facility Completing Procedure _________________________________________    Form Completed By (print name) _______________________________________      Signature __________________________________________________________      These reports are needed for  compliance    Please fax this completed form and a copy of the procedure report to our office as soon as possible to [phone] steven Hunter: Phone [phone]    We thank you for your assistance in treating our mutual patient     (sent to [facility])

## 2019-12-23 LAB
CERULOPLASMIN SERPL-MCNC: 31.3 MG/DL (ref 19–39)
HAV IGM SERPL QL IA: NEGATIVE
HBV CORE IGM SERPL QL IA: NEGATIVE
HBV SURFACE AG SERPL QL IA: NEGATIVE
HCV AB S/CO SERPL IA: <0.1 S/CO RATIO (ref 0–0.9)
HFE GENE MUT ANL BLD/T: NORMAL

## 2019-12-24 LAB
ARSENIC 24H UR-MCNC: NORMAL UG/L (ref 0–50)
CADMIUM 24H UR-MCNC: NORMAL UG/L
CREAT UR-MCNC: 0.6 G/L (ref 0.3–3)
INORG ARSENIC UR-MCNC: NORMAL UG/L (ref 0–19)
LEAD 24H UR-MCNC: NORMAL UG/L (ref 0–49)
MERCURY 24H UR-MCNC: NORMAL UG/L (ref 0–19)

## 2019-12-31 NOTE — PROGRESS NOTES
Chief Complaint   Patient presents with    Follow-up     labs    Cough        Patient ID: Juan Pablo Harrison is a 47 y o  female  44-year-old patient in for follow-up  Recent treatment for pneumonia-reports feeling better post antibiotic  Is due for follow-up chest x-ray to assess for resolution  BP high end normal   Recen tlabs with elevated LFTs, lipids and copper levels  Question underlying cause-  ?r/t toxins in [employer] work uniform vs other vs multifactorial   Additional labs as well ,imaging ordered to further evaluate  Pt to hold all vitamins/supplemets prior to testing  History reviewed  No pertinent past medical history  Past Surgical History:   Procedure Laterality Date    ANTERIOR CRUCIATE LIGAMENT REPAIR       SECTION         Patient Active Problem List   Diagnosis    BMI 36 0-36 9,adult    Pneumonia, unspecified organism    Abnormal results of liver function studies    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    Chronic fatigue       Family History   Problem Relation Age of Onset    Heart disease Father     Diabetes Sister     Diabetes Brother        Immunization History   Administered Date(s) Administered    Influenza TIV (IM) 2008    Tdap 2013       No Known Allergies    Current Outpatient Medications   Medication Sig Dispense Refill    triamterene-hydrochlorothiazide (DYAZIDE) 37 5-25 mg per capsule Take 1 capsule by mouth every morning 90 capsule 1    clotrimazole-betamethasone (LOTRISONE) 1-0 05 % cream Apply topically 2 (two) times a day (Patient not taking: Reported on 12/3/2019) 30 g 1     No current facility-administered medications for this visit          Social History     Socioeconomic History    Marital status: /Civil Union     Spouse name: None    Number of children: None    Years of education: None    Highest education level: None   Occupational History    None   Social Needs    Financial resource strain: None   [place] insecurity:     Worry: None     Inability: None    Transportation needs:     Medical: None     Non-medical: None   Tobacco Use    Smoking status: Never Smoker    Smokeless tobacco: Never Used   Substance and Sexual Activity    Alcohol use: Yes     Comment: occasional    Drug use: Never    Sexual activity: Yes     Partners: Male   Lifestyle    Physical activity:     Days per week: None     Minutes per session: None    Stress: None   Relationships    Social connections:     Talks on phone: None     Gets together: None     Attends Latter day service: None     Active member of club or organization: None     Attends meetings of clubs or organizations: None     Relationship status: None    Intimate partner violence:     Fear of current or ex partner: None     Emotionally abused: None     Physically abused: None     Forced sexual activity: None   Other Topics Concern    None   Social History Narrative    None       Review of Systems   Constitutional: Positive for fatigue  Negative for fever  HENT: Positive for postnasal drip  Respiratory:        Resolving pneumonia   Cardiovascular: Negative  Gastrointestinal:        Elevated lfts   Genitourinary: Negative  Musculoskeletal: Positive for arthralgias and myalgias  Skin: Negative for rash  Allergic/Immunologic: Positive for environmental allergies  Psychiatric/Behavioral: Positive for sleep disturbance  The patient is nervous/anxious  Objective:    /82 (BP Location: Left arm, Patient Position: Sitting, Cuff Size: Large)   Pulse 64   Temp 98 °F (36 7 °C) (Tympanic)   Resp 16   Ht 5' 2" (1 575 m)   Wt 88 kg (194 lb)   SpO2 97%   BMI 35 48 kg/m²        Physical Exam   Constitutional: She is oriented to person, place, and time  She appears well-developed and well-nourished  No distress  HENT:   Head: Normocephalic and atraumatic  Nose: Nose normal    Mouth/Throat: No oropharyngeal exudate     Eyes: Conjunctivae are normal  No scleral icterus  Neck: Neck supple  Cardiovascular: Normal rate, regular rhythm, normal heart sounds and intact distal pulses  Pulmonary/Chest: Effort normal and breath sounds normal  No respiratory distress  Abdominal: Soft  Bowel sounds are normal  There is no tenderness  Musculoskeletal: Normal range of motion  Neurological: She is alert and oriented to person, place, and time  No cranial nerve deficit  Skin: Skin is warm and dry  No rash noted  Psychiatric: She has a normal mood and affect  Nursing note and vitals reviewed  Assessment/Plan:     Diagnoses and all orders for this visit:    Pneumonia, unspecified organism  Comments:  follow-up cxr ordered--will follow-to resolution  pt doing subjectively better s/p abx  Orders:  -     XR chest pa & lateral; Future    Abnormal results of liver function studies  Comments:  pt to hold all supplements and then will rpt fasting lfts plus addtl labs as ordered; also will check abd u/s to futher evaluate thru imaging     Orders:  -     Hepatitis panel, acute; Future  -     Hemochromatosis mutation; Future  -     Comprehensive metabolic panel; Future    Chronic fatigue  Comments:  check addtl labs lsited, maintain hydration, adequate sleep  Orders:  -     Hepatitis panel, acute; Future  -     Hemochromatosis mutation; Future    Abnormal blood level of copper  Comments:  check for possible underlying contributors-see labs ordered  Hold all supplements  Orders:  -     Ceruloplasmin;  Future          Marina Aguila MD

## 2020-01-15 ENCOUNTER — TELEPHONE (OUTPATIENT)
Dept: FAMILY MEDICINE CLINIC | Facility: CLINIC | Age: 55
End: 2020-01-15

## 2020-01-15 NOTE — TELEPHONE ENCOUNTER
Jennifer Chowdary called from [facility] , she is sending over patient urine cx which came back negative   Pt says shes been on antibiotic for a few days and she doesn't feel better  fyi tc/cma

## 2020-01-16 DIAGNOSIS — R94.5 ABNORMAL RESULTS OF LIVER FUNCTION STUDIES: ICD-10-CM

## 2020-01-16 DIAGNOSIS — J18.9 PNEUMONIA, UNSPECIFIED ORGANISM: Primary | ICD-10-CM

## 2020-01-16 DIAGNOSIS — R79.0 ABNORMAL BLOOD LEVEL OF COPPER: ICD-10-CM

## 2020-01-16 NOTE — TELEPHONE ENCOUNTER
Spoke w pt--possible yeast infection-declines diflucan--will try otc monistat first     PT ALSO DUE FOR FOLLOW-UP LFTS AND COPPER LEVEL    PLEASE FAX ORDERS IN CHART TO [facility] FAX # [phone]   thanks

## 2020-01-17 ENCOUNTER — APPOINTMENT (OUTPATIENT)
Dept: RADIOLOGY | Facility: CLINIC | Age: 55
End: 2020-01-17
Payer: COMMERCIAL

## 2020-01-17 DIAGNOSIS — J18.9 PNEUMONIA, UNSPECIFIED ORGANISM: ICD-10-CM

## 2020-01-17 PROCEDURE — 71046 X-RAY EXAM CHEST 2 VIEWS: CPT

## 2020-01-21 LAB
ALBUMIN SERPL-MCNC: 4.5 G/DL (ref 3.5–5.5)
ALP SERPL-CCNC: 61 IU/L (ref 39–117)
ALT SERPL-CCNC: 25 IU/L (ref 0–32)
AST SERPL-CCNC: 24 IU/L (ref 0–40)
BILIRUB DIRECT SERPL-MCNC: 0.08 MG/DL (ref 0–0.4)
BILIRUB SERPL-MCNC: 0.4 MG/DL (ref 0–1.2)
COPPER SERPL-MCNC: 130 UG/DL (ref 72–166)
PROT SERPL-MCNC: 7.2 G/DL (ref 6–8.5)

## 2020-06-17 ENCOUNTER — OFFICE VISIT (OUTPATIENT)
Dept: FAMILY MEDICINE CLINIC | Facility: CLINIC | Age: 55
End: 2020-06-17
Payer: COMMERCIAL

## 2020-06-17 VITALS
DIASTOLIC BLOOD PRESSURE: 78 MMHG | SYSTOLIC BLOOD PRESSURE: 124 MMHG | RESPIRATION RATE: 14 BRPM | HEIGHT: 62 IN | BODY MASS INDEX: 36.4 KG/M2 | TEMPERATURE: 99.1 F | WEIGHT: 197.8 LBS | HEART RATE: 86 BPM

## 2020-06-17 DIAGNOSIS — R82.90 ABNORMAL URINE FINDING: ICD-10-CM

## 2020-06-17 DIAGNOSIS — Z12.31 BREAST CANCER SCREENING BY MAMMOGRAM: ICD-10-CM

## 2020-06-17 DIAGNOSIS — E78.5 HYPERLIPIDEMIA, UNSPECIFIED HYPERLIPIDEMIA TYPE: ICD-10-CM

## 2020-06-17 DIAGNOSIS — I10 ESSENTIAL HYPERTENSION: Primary | ICD-10-CM

## 2020-06-17 DIAGNOSIS — R92.2 DENSE BREAST TISSUE ON MAMMOGRAM: ICD-10-CM

## 2020-06-17 LAB
SL AMB  POCT GLUCOSE, UA: ABNORMAL
SL AMB LEUKOCYTE ESTERASE,UA: 500
SL AMB POCT BILIRUBIN,UA: ABNORMAL
SL AMB POCT BLOOD,UA: ABNORMAL
SL AMB POCT CLARITY,UA: ABNORMAL
SL AMB POCT COLOR,UA: YELLOW
SL AMB POCT KETONES,UA: ABNORMAL
SL AMB POCT NITRITE,UA: ABNORMAL
SL AMB POCT PH,UA: 6
SL AMB POCT SPECIFIC GRAVITY,UA: 1.02
SL AMB POCT URINE PROTEIN: ABNORMAL
SL AMB POCT UROBILINOGEN: ABNORMAL

## 2020-06-17 PROCEDURE — 1036F TOBACCO NON-USER: CPT | Performed by: FAMILY MEDICINE

## 2020-06-17 PROCEDURE — 3078F DIAST BP <80 MM HG: CPT | Performed by: FAMILY MEDICINE

## 2020-06-17 PROCEDURE — 81003 URINALYSIS AUTO W/O SCOPE: CPT | Performed by: FAMILY MEDICINE

## 2020-06-17 PROCEDURE — 3008F BODY MASS INDEX DOCD: CPT | Performed by: FAMILY MEDICINE

## 2020-06-17 PROCEDURE — 99214 OFFICE O/P EST MOD 30 MIN: CPT | Performed by: FAMILY MEDICINE

## 2020-06-17 PROCEDURE — 3074F SYST BP LT 130 MM HG: CPT | Performed by: FAMILY MEDICINE

## 2020-06-17 RX ORDER — TRIAMTERENE AND HYDROCHLOROTHIAZIDE 37.5; 25 MG/1; MG/1
1 CAPSULE ORAL EVERY MORNING
Qty: 90 CAPSULE | Refills: 3 | Status: SHIPPED | OUTPATIENT
Start: 2020-06-17 | End: 2021-06-29

## 2020-06-19 ENCOUNTER — TELEPHONE (OUTPATIENT)
Dept: FAMILY MEDICINE CLINIC | Facility: CLINIC | Age: 55
End: 2020-06-19

## 2020-06-19 LAB
BACTERIA UR CULT: NORMAL
Lab: NO GROWTH

## 2020-06-28 PROBLEM — R92.2 DENSE BREAST TISSUE ON MAMMOGRAM: Status: ACTIVE | Noted: 2020-06-28

## 2020-06-28 PROBLEM — R82.90 ABNORMAL URINE FINDING: Status: ACTIVE | Noted: 2020-06-28

## 2020-06-28 PROBLEM — R92.30 DENSE BREAST TISSUE ON MAMMOGRAM: Status: ACTIVE | Noted: 2020-06-28

## 2020-06-28 PROBLEM — R03.0 ELEVATED BLOOD PRESSURE READING: Status: ACTIVE | Noted: 2020-06-28

## 2020-06-28 PROBLEM — E78.5 HYPERLIPIDEMIA: Status: ACTIVE | Noted: 2020-06-28

## 2020-06-28 PROBLEM — I10 HYPERTENSION: Status: ACTIVE | Noted: 2020-06-28

## 2020-06-28 PROBLEM — Z12.31 BREAST CANCER SCREENING BY MAMMOGRAM: Status: ACTIVE | Noted: 2020-06-28

## 2020-11-28 DIAGNOSIS — J02.9 PHARYNGITIS, UNSPECIFIED ETIOLOGY: Primary | ICD-10-CM

## 2020-11-28 RX ORDER — PENICILLIN V POTASSIUM 500 MG/1
500 TABLET ORAL EVERY 8 HOURS SCHEDULED
Qty: 30 TABLET | Refills: 0 | Status: SHIPPED | OUTPATIENT
Start: 2020-11-28 | End: 2020-12-08

## 2020-12-22 DIAGNOSIS — Z12.31 BREAST CANCER SCREENING BY MAMMOGRAM: ICD-10-CM

## 2021-06-29 DIAGNOSIS — I10 ESSENTIAL HYPERTENSION: ICD-10-CM

## 2021-06-29 RX ORDER — TRIAMTERENE AND HYDROCHLOROTHIAZIDE 37.5; 25 MG/1; MG/1
CAPSULE ORAL
Qty: 90 CAPSULE | Refills: 3 | Status: SHIPPED | OUTPATIENT
Start: 2021-06-29 | End: 2022-03-15 | Stop reason: SDUPTHER

## 2022-03-01 ENCOUNTER — OFFICE VISIT (OUTPATIENT)
Dept: FAMILY MEDICINE CLINIC | Facility: CLINIC | Age: 57
End: 2022-03-01
Payer: COMMERCIAL

## 2022-03-01 VITALS
HEIGHT: 63 IN | HEART RATE: 82 BPM | WEIGHT: 210.4 LBS | TEMPERATURE: 98.1 F | BODY MASS INDEX: 37.28 KG/M2 | DIASTOLIC BLOOD PRESSURE: 92 MMHG | RESPIRATION RATE: 14 BRPM | SYSTOLIC BLOOD PRESSURE: 140 MMHG

## 2022-03-01 DIAGNOSIS — H92.02 OTALGIA OF LEFT EAR: Primary | ICD-10-CM

## 2022-03-01 DIAGNOSIS — Z12.31 ENCOUNTER FOR SCREENING MAMMOGRAM FOR MALIGNANT NEOPLASM OF BREAST: ICD-10-CM

## 2022-03-01 DIAGNOSIS — R53.83 OTHER FATIGUE: ICD-10-CM

## 2022-03-01 DIAGNOSIS — I10 ESSENTIAL HYPERTENSION: ICD-10-CM

## 2022-03-01 DIAGNOSIS — R92.2 DENSE BREAST TISSUE ON MAMMOGRAM: ICD-10-CM

## 2022-03-01 DIAGNOSIS — H69.82 ETD (EUSTACHIAN TUBE DYSFUNCTION), LEFT: ICD-10-CM

## 2022-03-01 DIAGNOSIS — E78.5 HYPERLIPIDEMIA, UNSPECIFIED HYPERLIPIDEMIA TYPE: ICD-10-CM

## 2022-03-01 PROCEDURE — 3008F BODY MASS INDEX DOCD: CPT | Performed by: FAMILY MEDICINE

## 2022-03-01 PROCEDURE — 3725F SCREEN DEPRESSION PERFORMED: CPT | Performed by: FAMILY MEDICINE

## 2022-03-01 PROCEDURE — 99214 OFFICE O/P EST MOD 30 MIN: CPT | Performed by: FAMILY MEDICINE

## 2022-03-01 RX ORDER — METOPROLOL SUCCINATE 25 MG/1
25 TABLET, EXTENDED RELEASE ORAL DAILY
Qty: 30 TABLET | Refills: 1 | Status: SHIPPED | OUTPATIENT
Start: 2022-03-01 | End: 2022-03-15 | Stop reason: SDUPTHER

## 2022-03-01 RX ORDER — METHYLPREDNISOLONE 4 MG/1
TABLET ORAL
Qty: 21 EACH | Refills: 0 | Status: SHIPPED | OUTPATIENT
Start: 2022-03-01 | End: 2022-03-15 | Stop reason: ALTCHOICE

## 2022-03-01 RX ORDER — AZITHROMYCIN 250 MG/1
TABLET, FILM COATED ORAL
Qty: 6 TABLET | Refills: 0 | Status: SHIPPED | OUTPATIENT
Start: 2022-03-01 | End: 2022-03-05

## 2022-03-08 ENCOUNTER — TELEPHONE (OUTPATIENT)
Dept: FAMILY MEDICINE CLINIC | Facility: CLINIC | Age: 57
End: 2022-03-08

## 2022-03-08 LAB
ALBUMIN SERPL-MCNC: 4.5 G/DL (ref 3.8–4.9)
ALBUMIN/GLOB SERPL: 1.7 {RATIO} (ref 1.2–2.2)
ALP SERPL-CCNC: 62 IU/L (ref 44–121)
ALT SERPL-CCNC: 19 IU/L (ref 0–32)
AMYLASE SERPL-CCNC: 31 U/L (ref 31–110)
AST SERPL-CCNC: 17 IU/L (ref 0–40)
BASOPHILS # BLD AUTO: 0 X10E3/UL (ref 0–0.2)
BASOPHILS NFR BLD AUTO: 1 %
BILIRUB SERPL-MCNC: 0.5 MG/DL (ref 0–1.2)
BUN SERPL-MCNC: 19 MG/DL (ref 6–24)
BUN/CREAT SERPL: 20 (ref 9–23)
CALCIUM SERPL-MCNC: 9.6 MG/DL (ref 8.7–10.2)
CHLORIDE SERPL-SCNC: 100 MMOL/L (ref 96–106)
CHOLEST SERPL-MCNC: 310 MG/DL (ref 100–199)
CO2 SERPL-SCNC: 23 MMOL/L (ref 20–29)
CREAT SERPL-MCNC: 0.94 MG/DL (ref 0.57–1)
EGFR: 71 ML/MIN/1.73
EOSINOPHIL # BLD AUTO: 0.1 X10E3/UL (ref 0–0.4)
EOSINOPHIL NFR BLD AUTO: 2 %
ERYTHROCYTE [DISTWIDTH] IN BLOOD BY AUTOMATED COUNT: 12.7 % (ref 11.7–15.4)
GLOBULIN SER-MCNC: 2.6 G/DL (ref 1.5–4.5)
GLUCOSE SERPL-MCNC: 90 MG/DL (ref 65–99)
HBA1C MFR BLD: 5.7 % (ref 4.8–5.6)
HCT VFR BLD AUTO: 44.2 % (ref 34–46.6)
HDLC SERPL-MCNC: 55 MG/DL
HGB BLD-MCNC: 15 G/DL (ref 11.1–15.9)
IMM GRANULOCYTES # BLD: 0 X10E3/UL (ref 0–0.1)
IMM GRANULOCYTES NFR BLD: 0 %
LDLC SERPL CALC-MCNC: 211 MG/DL (ref 0–99)
LIPASE SERPL-CCNC: 20 U/L (ref 14–72)
LYMPHOCYTES # BLD AUTO: 3.1 X10E3/UL (ref 0.7–3.1)
LYMPHOCYTES NFR BLD AUTO: 47 %
MAGNESIUM SERPL-MCNC: 2.4 MG/DL (ref 1.6–2.3)
MCH RBC QN AUTO: 28.6 PG (ref 26.6–33)
MCHC RBC AUTO-ENTMCNC: 33.9 G/DL (ref 31.5–35.7)
MCV RBC AUTO: 84 FL (ref 79–97)
MICRODELETION SYND BLD/T FISH: NORMAL
MICRODELETION SYND BLD/T FISH: NORMAL
MONOCYTES # BLD AUTO: 0.5 X10E3/UL (ref 0.1–0.9)
MONOCYTES NFR BLD AUTO: 8 %
NEUTROPHILS # BLD AUTO: 2.8 X10E3/UL (ref 1.4–7)
NEUTROPHILS NFR BLD AUTO: 42 %
PLATELET # BLD AUTO: 307 X10E3/UL (ref 150–450)
POTASSIUM SERPL-SCNC: 4.7 MMOL/L (ref 3.5–5.2)
PROT SERPL-MCNC: 7.1 G/DL (ref 6–8.5)
RBC # BLD AUTO: 5.24 X10E6/UL (ref 3.77–5.28)
SODIUM SERPL-SCNC: 138 MMOL/L (ref 134–144)
TRIGL SERPL-MCNC: 225 MG/DL (ref 0–149)
TSH SERPL DL<=0.005 MIU/L-ACNC: 4.2 UIU/ML (ref 0.45–4.5)
WBC # BLD AUTO: 6.7 X10E3/UL (ref 3.4–10.8)

## 2022-03-08 NOTE — TELEPHONE ENCOUNTER
----- Message from Susen Koyanagi, MD sent at 3/8/2022  8:14 AM EST -----  Pl, advise pt -  madhu is needed for f/u labs with Dr [facility]

## 2022-03-13 PROBLEM — H69.82 ETD (EUSTACHIAN TUBE DYSFUNCTION), LEFT: Status: ACTIVE | Noted: 2022-03-13

## 2022-03-13 PROBLEM — H92.02 OTALGIA OF LEFT EAR: Status: ACTIVE | Noted: 2022-03-13

## 2022-03-13 PROBLEM — H69.92 ETD (EUSTACHIAN TUBE DYSFUNCTION), LEFT: Status: ACTIVE | Noted: 2022-03-13

## 2022-03-13 NOTE — PROGRESS NOTES
Assessment/Plan:    1  Otalgia of left ear  -     azithromycin (ZITHROMAX) 250 mg tablet; Take 2 tablets today then 1 tablet daily x 4 days  -     methylPREDNISolone 4 MG tablet therapy pack; Use as directed on package    2  ETD (Eustachian tube dysfunction), left  -     azithromycin (ZITHROMAX) 250 mg tablet; Take 2 tablets today then 1 tablet daily x 4 days  -     methylPREDNISolone 4 MG tablet therapy pack; Use as directed on package    3  Essential hypertension  -     Comprehensive metabolic panel; Future  -     Magnesium; Future  -     metoprolol succinate (TOPROL-XL) 25 mg 24 hr tablet; Take 1 tablet (25 mg total) by mouth daily    4  Hyperlipidemia, unspecified hyperlipidemia type  -     Amylase; Future  -     Lipase; Future  -     Lipid Panel with Direct LDL reflex; Future  -     TSH, 3rd generation; Future    5  Encounter for screening mammogram for malignant neoplasm of breast  -     Mammo screening bilateral w 3d & cad; Future; Expected date: 03/01/2022    6  Dense breast tissue on mammogram  -     Mammo screening bilateral w 3d & cad; Future; Expected date: 03/01/2022  -     US breast screening bilateral complete (ABUS); Future; Expected date: 03/01/2022    7  Other fatigue  -     CBC; Future  -     TSH, 3rd generation; Future    8  BMI 37 0-37 9, adult  -     Comprehensive metabolic panel; Future  -     Hemoglobin A1C; Future      BMI Counseling: Body mass index is 37 27 kg/m²  The BMI is above normal  Nutrition recommendations include encouraging healthy choices of fruits and vegetables, consuming healthier snacks, moderation in carbohydrate intake, increasing intake of lean protein, reducing intake of saturated and trans fat and reducing intake of cholesterol  Exercise recommendations include exercising 3-5 times per week and strength training exercises  No pharmacotherapy was ordered  Rationale for BMI follow-up plan is due to patient being overweight or obese       Depression Screening and Follow-up Plan: Patient was screened for depression during today's encounter  They screened negative with a PHQ-2 score of 0  Subjective:      Patient ID: Walt Catherine is a 64 y o  female  Chief Complaint   Patient presents with    Ear Problem     left ear feels like it needs to pop, heres buzzing for pasted 3 days , pt had runny nose all day yesterday    Blood Pressure Check    Hyperlipidemia    Fatigue       HPI  Main c/o left ear feeling clogged, "like it needs to pop"-hearing buzzing at times  +congestion, runny nose, fatigue, no sig s/t or cough  No rash, fever or gi sx  Fully COVID vaccinated and gets tested regularly for work as   Concerned about how she feels with upcoming flights this week  BP elevated, denies CP or focal weakness  Due for labs and mammo &  Breast u/s (hx dense breast tissue)  ? need for shingles vacc--will defer until feeling better    The following portions of the patient's history were reviewed and updated as appropriate: allergies, current medications, past family history, past medical history, past social history, past surgical history and problem list     Review of Systems   Constitutional: Positive for fatigue  Negative for fever  HENT: Positive for congestion, ear pain, postnasal drip, rhinorrhea, sinus pressure and tinnitus  Eyes:        Wears glasses   Respiratory: Negative  Cardiovascular: Negative  Gastrointestinal: Negative  Genitourinary: Negative  Skin: Negative for rash  Neurological: Negative            Current Outpatient Medications   Medication Sig Dispense Refill    triamterene-hydrochlorothiazide (DYAZIDE) 37 5-25 mg per capsule TAKE 1 CAPSULE BY MOUTH EVERY DAY IN THE MORNING 90 capsule 3    methylPREDNISolone 4 MG tablet therapy pack Use as directed on package 21 each 0    metoprolol succinate (TOPROL-XL) 25 mg 24 hr tablet Take 1 tablet (25 mg total) by mouth daily 30 tablet 1     No current facility-administered medications for this visit  Objective:    /92 (BP Location: Left arm, Patient Position: Sitting, Cuff Size: Large)   Pulse 82   Temp 98 1 °F (36 7 °C)   Resp 14   Ht 5' 3" (1 6 m)   Wt 95 4 kg (210 lb 6 4 oz)   BMI 37 27 kg/m²        Physical Exam  Vitals and nursing note reviewed  Constitutional:       General: She is not in acute distress  HENT:      Right Ear: There is no impacted cerumen  Left Ear: There is no impacted cerumen  Ears:      Comments: TMs dull     Nose: Congestion and rhinorrhea present  Mouth/Throat:      Pharynx: No oropharyngeal exudate or posterior oropharyngeal erythema  Eyes:      General: No scleral icterus  Conjunctiva/sclera: Conjunctivae normal    Cardiovascular:      Rate and Rhythm: Normal rate and regular rhythm  Pulmonary:      Effort: Pulmonary effort is normal  No respiratory distress  Breath sounds: Normal breath sounds  Musculoskeletal:      Cervical back: Neck supple  Skin:     General: Skin is warm and dry  Neurological:      General: No focal deficit present  Mental Status: She is alert and oriented to person, place, and time  Cranial Nerves: No cranial nerve deficit     Psychiatric:         Mood and Affect: Mood normal              Randy Reyes MD

## 2022-03-15 ENCOUNTER — OFFICE VISIT (OUTPATIENT)
Dept: FAMILY MEDICINE CLINIC | Facility: CLINIC | Age: 57
End: 2022-03-15
Payer: COMMERCIAL

## 2022-03-15 DIAGNOSIS — E78.2 MIXED HYPERLIPIDEMIA: ICD-10-CM

## 2022-03-15 DIAGNOSIS — G47.00 INSOMNIA, UNSPECIFIED TYPE: ICD-10-CM

## 2022-03-15 DIAGNOSIS — I10 ESSENTIAL HYPERTENSION: Primary | ICD-10-CM

## 2022-03-15 DIAGNOSIS — R94.31 ABNORMAL EKG: ICD-10-CM

## 2022-03-15 PROCEDURE — 1036F TOBACCO NON-USER: CPT | Performed by: FAMILY MEDICINE

## 2022-03-15 PROCEDURE — 99213 OFFICE O/P EST LOW 20 MIN: CPT | Performed by: FAMILY MEDICINE

## 2022-03-15 RX ORDER — ZOLPIDEM TARTRATE 5 MG/1
5 TABLET ORAL
Qty: 30 TABLET | Refills: 0 | Status: SHIPPED | OUTPATIENT
Start: 2022-03-15

## 2022-03-15 RX ORDER — TRIAMTERENE AND HYDROCHLOROTHIAZIDE 37.5; 25 MG/1; MG/1
1 CAPSULE ORAL EVERY MORNING
Qty: 90 CAPSULE | Refills: 1 | Status: SHIPPED | OUTPATIENT
Start: 2022-03-15

## 2022-03-15 RX ORDER — METOPROLOL SUCCINATE 25 MG/1
25 TABLET, EXTENDED RELEASE ORAL DAILY
Qty: 90 TABLET | Refills: 1 | Status: SHIPPED | OUTPATIENT
Start: 2022-03-15

## 2022-03-21 VITALS
WEIGHT: 202.4 LBS | TEMPERATURE: 97.8 F | DIASTOLIC BLOOD PRESSURE: 86 MMHG | SYSTOLIC BLOOD PRESSURE: 138 MMHG | BODY MASS INDEX: 35.85 KG/M2 | OXYGEN SATURATION: 96 % | HEART RATE: 96 BPM

## 2022-03-22 NOTE — PROGRESS NOTES
Assessment/Plan:    1  Essential hypertension  -     metoprolol succinate (TOPROL-XL) 25 mg 24 hr tablet; Take 1 tablet (25 mg total) by mouth daily  -     triamterene-hydrochlorothiazide (DYAZIDE) 37 5-25 mg per capsule; Take 1 capsule by mouth every morning  -     Echo complete w/ contrast if indicated; Future; Expected date: 03/15/2022    2  Insomnia, unspecified type  -     zolpidem (AMBIEN) 5 mg tablet; Take 1 tablet (5 mg total) by mouth daily at bedtime as needed for sleep    3  Abnormal EKG  -     Echo complete w/ contrast if indicated; Future; Expected date: 03/15/2022    4  Mixed hyperlipidemia  Assessment & Plan:  Declines statin at this time  Would like to work on lifestyle modifications w diet/exercise prior to start of med      5  BMI 35 0-35 9,adult            Subjective:      Patient ID: Flavia Monsalve is a 64 y o  female  HPI  Follow-up on labs and BP  Lipids increased--FJ=192, JZ=378, HDL=55, EWB=254  Cof6p=0 7%  Remainder labs essentially wnl  EKG-SR with marked sinus arrhythmia; Possible left atrial enlargement  Denies CP or focal weakness  Has difficulty w sleep from time to time  Requests rx for Zoldipem      The following portions of the patient's history were reviewed and updated as appropriate: allergies, current medications, past family history, past medical history, past social history, past surgical history and problem list     Review of Systems   Constitutional: Positive for fatigue  Respiratory: Negative  Cardiovascular: Negative  Gastrointestinal: Negative  Musculoskeletal: Positive for arthralgias and myalgias  Skin: Negative  Neurological: Negative  Psychiatric/Behavioral: Negative            Current Outpatient Medications   Medication Sig Dispense Refill    metoprolol succinate (TOPROL-XL) 25 mg 24 hr tablet Take 1 tablet (25 mg total) by mouth daily 90 tablet 1    triamterene-hydrochlorothiazide (DYAZIDE) 37 5-25 mg per capsule Take 1 capsule by mouth every morning 90 capsule 1    zolpidem (AMBIEN) 5 mg tablet Take 1 tablet (5 mg total) by mouth daily at bedtime as needed for sleep 30 tablet 0     No current facility-administered medications for this visit  Objective:    /86 (BP Location: Left arm, Patient Position: Sitting, Cuff Size: Large)   Pulse 96   Temp 97 8 °F (36 6 °C)   Wt 91 8 kg (202 lb 6 4 oz)   SpO2 96%   BMI 35 85 kg/m²        Physical Exam  Vitals and nursing note reviewed  Constitutional:       General: She is not in acute distress  Cardiovascular:      Rate and Rhythm: Normal rate and regular rhythm  Pulmonary:      Effort: Pulmonary effort is normal  No respiratory distress  Breath sounds: Normal breath sounds  Musculoskeletal:      Cervical back: Neck supple  Neurological:      General: No focal deficit present  Mental Status: She is alert and oriented to person, place, and time  Cranial Nerves: No cranial nerve deficit           Georgie Joseph MD

## 2022-03-22 NOTE — ASSESSMENT & PLAN NOTE
Declines statin at this time  Would like to work on lifestyle modifications w diet/exercise prior to start of med

## 2022-09-03 DIAGNOSIS — I10 ESSENTIAL HYPERTENSION: ICD-10-CM

## 2022-09-06 RX ORDER — TRIAMTERENE AND HYDROCHLOROTHIAZIDE 37.5; 25 MG/1; MG/1
CAPSULE ORAL
Qty: 90 CAPSULE | Refills: 1 | Status: SHIPPED | OUTPATIENT
Start: 2022-09-06

## 2022-09-06 RX ORDER — METOPROLOL SUCCINATE 25 MG/1
25 TABLET, EXTENDED RELEASE ORAL DAILY
Qty: 90 TABLET | Refills: 1 | Status: SHIPPED | OUTPATIENT
Start: 2022-09-06

## 2023-03-11 DIAGNOSIS — I10 ESSENTIAL HYPERTENSION: ICD-10-CM

## 2023-03-11 RX ORDER — TRIAMTERENE AND HYDROCHLOROTHIAZIDE 37.5; 25 MG/1; MG/1
CAPSULE ORAL
Qty: 90 CAPSULE | Refills: 1 | Status: SHIPPED | OUTPATIENT
Start: 2023-03-11

## 2023-03-16 DIAGNOSIS — I10 ESSENTIAL HYPERTENSION: ICD-10-CM

## 2023-03-16 RX ORDER — METOPROLOL SUCCINATE 25 MG/1
25 TABLET, EXTENDED RELEASE ORAL DAILY
Qty: 90 TABLET | Refills: 1 | Status: SHIPPED | OUTPATIENT
Start: 2023-03-16

## 2023-05-24 DIAGNOSIS — J02.9 SORE THROAT: Primary | ICD-10-CM

## 2023-05-24 DIAGNOSIS — R09.81 SINUS CONGESTION: ICD-10-CM

## 2023-05-24 RX ORDER — AMOXICILLIN 875 MG/1
875 TABLET, COATED ORAL 2 TIMES DAILY
Qty: 20 TABLET | Refills: 0 | Status: SHIPPED | OUTPATIENT
Start: 2023-05-24 | End: 2023-06-03

## 2023-05-24 RX ORDER — PREDNISONE 20 MG/1
40 TABLET ORAL DAILY
Qty: 10 TABLET | Refills: 0 | Status: SHIPPED | OUTPATIENT
Start: 2023-05-24 | End: 2023-05-29

## 2023-06-25 DIAGNOSIS — N39.0 URINARY TRACT INFECTION WITHOUT HEMATURIA, SITE UNSPECIFIED: Primary | ICD-10-CM

## 2023-06-25 RX ORDER — SULFAMETHOXAZOLE AND TRIMETHOPRIM 800; 160 MG/1; MG/1
1 TABLET ORAL 2 TIMES DAILY
Qty: 6 TABLET | Refills: 0 | Status: SHIPPED | OUTPATIENT
Start: 2023-06-25 | End: 2023-06-28

## 2023-07-01 DIAGNOSIS — I10 ESSENTIAL HYPERTENSION: ICD-10-CM

## 2023-07-01 RX ORDER — METOPROLOL SUCCINATE 25 MG/1
25 TABLET, EXTENDED RELEASE ORAL DAILY
Qty: 90 TABLET | Refills: 1 | Status: SHIPPED | OUTPATIENT
Start: 2023-07-01

## 2023-08-01 DIAGNOSIS — M54.12 CERVICAL RADICULOPATHY: Primary | ICD-10-CM

## 2023-08-01 RX ORDER — GABAPENTIN 100 MG/1
100 CAPSULE ORAL 3 TIMES DAILY
Qty: 90 CAPSULE | Refills: 1 | Status: SHIPPED | OUTPATIENT
Start: 2023-08-01

## 2023-08-01 RX ORDER — PREDNISONE 10 MG/1
TABLET ORAL
Qty: 30 TABLET | Refills: 1 | Status: SHIPPED | OUTPATIENT
Start: 2023-08-01

## 2023-08-15 ENCOUNTER — OFFICE VISIT (OUTPATIENT)
Dept: FAMILY MEDICINE CLINIC | Facility: CLINIC | Age: 58
End: 2023-08-15
Payer: COMMERCIAL

## 2023-08-15 VITALS
DIASTOLIC BLOOD PRESSURE: 80 MMHG | TEMPERATURE: 98 F | RESPIRATION RATE: 16 BRPM | BODY MASS INDEX: 37.32 KG/M2 | HEIGHT: 62 IN | WEIGHT: 202.8 LBS | HEART RATE: 68 BPM | SYSTOLIC BLOOD PRESSURE: 138 MMHG

## 2023-08-15 DIAGNOSIS — M25.512 CHRONIC LEFT SHOULDER PAIN: ICD-10-CM

## 2023-08-15 DIAGNOSIS — Z13.1 SCREENING FOR DIABETES MELLITUS: ICD-10-CM

## 2023-08-15 DIAGNOSIS — R30.0 DYSURIA: ICD-10-CM

## 2023-08-15 DIAGNOSIS — Z13.0 SCREENING FOR DEFICIENCY ANEMIA: ICD-10-CM

## 2023-08-15 DIAGNOSIS — I10 ESSENTIAL HYPERTENSION: ICD-10-CM

## 2023-08-15 DIAGNOSIS — M25.50 ARTHRALGIA, UNSPECIFIED JOINT: ICD-10-CM

## 2023-08-15 DIAGNOSIS — Z13.29 SCREENING FOR THYROID DISORDER: ICD-10-CM

## 2023-08-15 DIAGNOSIS — E78.2 MIXED HYPERLIPIDEMIA: ICD-10-CM

## 2023-08-15 DIAGNOSIS — Z12.31 ENCOUNTER FOR SCREENING MAMMOGRAM FOR MALIGNANT NEOPLASM OF BREAST: ICD-10-CM

## 2023-08-15 DIAGNOSIS — M54.12 CERVICAL RADICULOPATHY: ICD-10-CM

## 2023-08-15 DIAGNOSIS — Z00.00 PHYSICAL EXAM: Primary | ICD-10-CM

## 2023-08-15 DIAGNOSIS — G89.29 CHRONIC LEFT SHOULDER PAIN: ICD-10-CM

## 2023-08-15 LAB
SL AMB  POCT GLUCOSE, UA: ABNORMAL
SL AMB LEUKOCYTE ESTERASE,UA: 500
SL AMB POCT BILIRUBIN,UA: ABNORMAL
SL AMB POCT BLOOD,UA: ABNORMAL
SL AMB POCT CLARITY,UA: CLEAR
SL AMB POCT COLOR,UA: YELLOW
SL AMB POCT KETONES,UA: ABNORMAL
SL AMB POCT NITRITE,UA: ABNORMAL
SL AMB POCT PH,UA: 6.5
SL AMB POCT SPECIFIC GRAVITY,UA: 1.01
SL AMB POCT URINE PROTEIN: ABNORMAL
SL AMB POCT UROBILINOGEN: ABNORMAL

## 2023-08-15 PROCEDURE — 99396 PREV VISIT EST AGE 40-64: CPT | Performed by: FAMILY MEDICINE

## 2023-08-15 PROCEDURE — 93000 ELECTROCARDIOGRAM COMPLETE: CPT | Performed by: FAMILY MEDICINE

## 2023-08-15 PROCEDURE — 81003 URINALYSIS AUTO W/O SCOPE: CPT | Performed by: FAMILY MEDICINE

## 2023-08-15 NOTE — PROGRESS NOTES
[facility] HEALTH MAINTENANCE OFFICE VISIT  [facility]    NAME: Melquiades Calderon  AGE: 62 y.o. SEX: female  : 1965     DATE: 2023    Assessment and Plan     1. Physical exam  -     Lipase; Future  -     CBC; Future  -     Comprehensive metabolic panel; Future  -     Hemoglobin A1C; Future  -     Lipid Panel with Direct LDL reflex; Future  -     Magnesium; Future  -     TSH, 3rd generation; Future  -     NILDA Screen w/ Reflex to Titer/Pattern; Future    2. Essential hypertension  Assessment & Plan:  BP in range-cont. current mgt. Orders:  -     POCT ECG  -     Ambulatory Referral to Cardiology; Future  -     Comprehensive metabolic panel; Future  -     Magnesium; Future    3. Encounter for screening mammogram for malignant neoplasm of breast  -     Mammo screening bilateral w 3d & cad; Future; Expected date: 08/15/2023    4. Dysuria  -     POCT urine dip auto non-scope  -     Urine culture    5. Mixed hyperlipidemia  -     Ambulatory Referral to Cardiology; Future  -     Lipase; Future  -     Comprehensive metabolic panel; Future  -     Lipid Panel with Direct LDL reflex; Future  -     TSH, 3rd generation; Future    6. Screening for diabetes mellitus  -     Comprehensive metabolic panel; Future  -     Hemoglobin A1C; Future    7. Screening for thyroid disorder  -     TSH, 3rd generation; Future    8. Screening for deficiency anemia  -     CBC; Future    9. Arthralgia, unspecified joint  -     NILDA Screen w/ Reflex to Titer/Pattern; Future    10. Chronic left shoulder pain  -     XR shoulder 2+ vw left; Future; Expected date: 08/15/2023  -     MRI shoulder left wo contrast; Future; Expected date: 08/15/2023    11. Cervical radiculopathy  -     MRI cervical spine wo contrast; Future; Expected date: 08/15/2023    12.  BMI 37.0-37.9, adult        Patient Counseling:   Nutrition: Stressed importance of a well balanced diet, moderation of sodium/saturated fat, caloric balance and sufficient intake of fiber  Exercise: Stressed the importance of regular exercise with a goal of 150 minutes per week  Dental Health: Discussed daily flossing and brushing and regular dental visits   Alcohol Use:  Recommended moderation of alcohol intake  Injury Prevention: Discussed Safety Belts, Safety Helmets, and Smoke Detectors    Immunizations reviewed: Risks and Benefits discussed  Discussed benefits of:  Colon Cancer Screening, Mammogram , Cervical Cancer screening and Screening labs. BMI Counseling: Body mass index is 37.09 kg/m². Discussed with patient's BMI with her. The BMI is above normal. Nutrition recommendations include 3-5 servings of fruits/vegetables daily, reducing fast food intake, consuming healthier snacks, decreasing soda and/or juice intake, moderation in carbohydrate intake, increasing intake of lean protein, reducing intake of saturated fat and trans fat and reducing intake of cholesterol. Exercise recommendations include exercising 3-5 times per week and strength training exercises.           Chief Complaint     Chief Complaint   Patient presents with   • Physical Exam       History of Present Illness     HPI    Well Adult Physical   Patient here for a comprehensive physical exam.      Diet and Physical Activity  Diet: well balanced diet  Exercise: frequently      Depression Screen  PHQ-2/9 Depression Screening    Little interest or pleasure in doing things: 0 - not at all  Feeling down, depressed, or hopeless: 0 - not at all  PHQ-2 Score: 0  PHQ-2 Interpretation: Negative depression screen          General Health  Hearing: Normal:  bilateral  Vision: goes for regular eye exams and wears glasses  Dental: regular dental visits    Reproductive Health  Post-menopausal  and Follows with gynecologist      The following portions of the patient's history were reviewed and updated as appropriate: allergies, current medications, past family history, past medical history, past social history, past surgical history and problem list.    Review of Systems     Review of Systems   Constitutional: Positive for fatigue. Respiratory: Negative. Cardiovascular: Negative. Gastrointestinal: Negative. Musculoskeletal: Positive for arthralgias and myalgias. Skin: Negative. Neurological: Negative. Psychiatric/Behavioral: Negative.         Past Medical History     Past Medical History:   Diagnosis Date   • Arthritis    • Cervical radiculopathy    • Hyperlipidemia    • Hypertension    • Left shoulder pain, unspecified chronicity    • Obesity    • Urinary tract infection    • Visual impairment        Past Surgical History     Past Surgical History:   Procedure Laterality Date   • ANTERIOR CRUCIATE LIGAMENT REPAIR     •  SECTION         Social History     Social History     Socioeconomic History   • Marital status: /Civil Union     Spouse name: None   • Number of children: None   • Years of education: None   • Highest education level: None   Occupational History   • None   Tobacco Use   • Smoking status: Never   • Smokeless tobacco: Never   Vaping Use   • Vaping Use: Never used   Substance and Sexual Activity   • Alcohol use: Yes     Comment: occasional   • Drug use: Never   • Sexual activity: Yes     Partners: Male   Other Topics Concern   • None   Social History Narrative   • None     Social Determinants of Health     Financial Resource Strain: Not on file   Food Insecurity: Not on file   Transportation Needs: Not on file   Physical Activity: Not on file   Stress: Not on file   Social Connections: Not on file   Intimate Partner Violence: Not on file   Housing Stability: Not on file       Family History     Family History   Problem Relation Age of Onset   • Heart disease Father    • Diabetes Sister    • Diabetes Brother        Current Medications       Current Outpatient Medications:   •  gabapentin (Neurontin) 100 mg capsule, Take 1 capsule (100 mg total) by mouth 3 (three) times a day Or as directed, Disp: 90 capsule, Rfl: 1  •  metoprolol succinate (TOPROL-XL) 25 mg 24 hr tablet, TAKE 1 TABLET (25 MG TOTAL) BY MOUTH DAILY. , Disp: 90 tablet, Rfl: 1  •  predniSONE 10 mg tablet, Take with food as directed., Disp: 30 tablet, Rfl: 1  •  triamterene-hydrochlorothiazide (DYAZIDE) 37.5-25 mg per capsule, TAKE 1 CAPSULE BY MOUTH EVERY DAY IN THE MORNING, Disp: 90 capsule, Rfl: 1  •  zolpidem (AMBIEN) 5 mg tablet, Take 1 tablet (5 mg total) by mouth daily at bedtime as needed for sleep, Disp: 30 tablet, Rfl: 0     Allergies     No Known Allergies    Immunization History   Administered Date(s) Administered   • COVID-19 J&J ([employer]) vaccine 0.5 mL 03/23/2021   • COVID-19 MODERNA VACC 0.5 ML IM 01/04/2022   • Influenza, seasonal, injectable 11/14/2008   • Tdap 08/20/2013     Objective     /80 (BP Location: Left arm, Patient Position: Sitting, Cuff Size: Large)   Pulse 68   Temp 98 °F (36.7 °C)   Resp 16   Ht 5' 2" (1.575 m)   Wt 92 kg (202 lb 12.8 oz)   BMI 37.09 kg/m²      Physical Exam  Vitals and nursing note reviewed. Constitutional:       General: She is not in acute distress. HENT:      Nose: Nose normal.      Mouth/Throat:      Pharynx: No oropharyngeal exudate. Eyes:      General: No scleral icterus. Conjunctiva/sclera: Conjunctivae normal.   Cardiovascular:      Rate and Rhythm: Normal rate and regular rhythm. Pulses: Normal pulses. Pulmonary:      Effort: Pulmonary effort is normal. No respiratory distress. Breath sounds: Normal breath sounds. Abdominal:      General: Bowel sounds are normal.      Palpations: Abdomen is soft. Tenderness: There is no abdominal tenderness. There is no right CVA tenderness, left CVA tenderness, guarding or rebound. Musculoskeletal:         General: Tenderness (left scapular/shoulder) present. Cervical back: Neck supple. No tenderness. Right lower leg: No edema. Left lower leg: No edema. Skin:     General: Skin is warm and dry. Coloration: Skin is not jaundiced. Findings: No rash. Neurological:      General: No focal deficit present. Mental Status: She is alert and oriented to person, place, and time. Cranial Nerves: No cranial nerve deficit.    Psychiatric:         Mood and Affect: Mood normal.           Vision Screening    Right eye Left eye Both eyes   Without correction      With correction 20/25 20/50 202/25           Sean Oliver MD  [address] [place] [address]

## 2023-08-16 ENCOUNTER — TELEPHONE (OUTPATIENT)
Dept: ADMINISTRATIVE | Facility: OTHER | Age: 58
End: 2023-08-16

## 2023-08-16 NOTE — TELEPHONE ENCOUNTER
----- Message from Hermila Tiffanie, [address] sent at 8/15/2023  8:18 AM EDT -----  Regarding: pap,mammo  08/15/23 8:18 AM    Hello, our patient attached above has had pap mammo  completed/performed. Please assist in updating the patient chart by all about woman  The date of service is 2023.     Thank you,  Eneida LEE

## 2023-08-16 NOTE — LETTER
Procedure Request Form: Cervical Cancer Screening and Mammogram      Date Requested: 23  Patient: Odessa Rolon  Patient : 1965   Referring Provider: Gildardo Spain MD        Date of Procedure ______________________________       The above patient has informed us that they have completed their   most recent Cervical Cancer Screening and Mammogram at your facility. Please complete   this form and attach all corresponding procedure reports/results. Comments __________________________________________________________  ____________________________________________________________________  ____________________________________________________________________  ____________________________________________________________________    Facility Completing Procedure _________________________________________    Form Completed By (print name) _______________________________________      Signature __________________________________________________________      These reports are needed for  compliance. Please fax this completed form and a copy of the procedure report to our office located at [address] [place] [address] as soon as possible to Fax [phone] steven Alba: Phone [phone]    We thank you for your assistance in treating our mutual patient.

## 2023-08-16 NOTE — TELEPHONE ENCOUNTER
----- Message from Perla [place], [place] sent at 8/15/2023  8:18 AM EDT -----  Regarding: pap,mammo  08/15/23 8:18 AM    Hello, our patient attached above has had pap mammo  completed/performed. Please assist in updating the patient chart by all about woman  The date of service is 2023.     Thank you,  Eneida Tsang  [facility] ILIANA LEE

## 2023-08-16 NOTE — LETTER
Procedure Request Form: Cervical Cancer Screening and Mammogram      Date Requested: 23  Patient: Andriy Guerrero  Patient : 1965   Referring Provider: Eric Farah MD        Date of Procedure ______________________________       The above patient has informed us that they have completed their   most recent Cervical Cancer Screening and Mammogram at your facility. Please complete   this form and attach all corresponding procedure reports/results. Comments __________________________________________________________  ____________________________________________________________________  ____________________________________________________________________  ____________________________________________________________________    Facility Completing Procedure _________________________________________    Form Completed By (print name) _______________________________________      Signature __________________________________________________________      These reports are needed for  compliance. Please fax this completed form and a copy of the procedure report to our office located at [address] [place] [address] as soon as possible to Fax [phone] steven Tomas Cap: Phone [phone]    We thank you for your assistance in treating our mutual patient.

## 2023-08-16 NOTE — LETTER
Procedure Request Form: Mammogram      Date Requested: 23  Patient: Augusta Alpers  Patient : 1965   Referring Provider: Griselda Bald, MD        Date of Procedure ______________________________       The above patient has informed us that they have completed their   most recent Mammogram at your facility. Please complete   this form and attach all corresponding procedure reports/results. Comments __________________________________________________________  ____________________________________________________________________  ____________________________________________________________________  ____________________________________________________________________    Facility Completing Procedure _________________________________________    Form Completed By (print name) _______________________________________      Signature __________________________________________________________      These reports are needed for  compliance. Please fax this completed form and a copy of the procedure report to our office located at [address] [place] [address] as soon as possible to Fax [phone] attention Jennifer Gabriel: Phone [phone]    We thank you for your assistance in treating our mutual patient.

## 2023-08-18 LAB
BACTERIA UR CULT: ABNORMAL
Lab: ABNORMAL

## 2023-08-31 LAB
ALBUMIN SERPL-MCNC: 4.3 G/DL (ref 3.8–4.9)
ALBUMIN/GLOB SERPL: 1.9 {RATIO} (ref 1.2–2.2)
ALP SERPL-CCNC: 52 IU/L (ref 44–121)
ALT SERPL-CCNC: 26 IU/L (ref 0–32)
ANA SER QL: NEGATIVE
AST SERPL-CCNC: 20 IU/L (ref 0–40)
BASOPHILS # BLD AUTO: 0 X10E3/UL (ref 0–0.2)
BASOPHILS NFR BLD AUTO: 0 %
BILIRUB SERPL-MCNC: 0.4 MG/DL (ref 0–1.2)
BUN SERPL-MCNC: 16 MG/DL (ref 6–24)
BUN/CREAT SERPL: 21 (ref 9–23)
CALCIUM SERPL-MCNC: 9.7 MG/DL (ref 8.7–10.2)
CHLORIDE SERPL-SCNC: 103 MMOL/L (ref 96–106)
CHOLEST SERPL-MCNC: 331 MG/DL (ref 100–199)
CO2 SERPL-SCNC: 22 MMOL/L (ref 20–29)
CREAT SERPL-MCNC: 0.76 MG/DL (ref 0.57–1)
CYTOLOGY CMNT CVX/VAG CYTO-IMP: ABNORMAL
EGFR: 91 ML/MIN/1.73
EOSINOPHIL # BLD AUTO: 0.1 X10E3/UL (ref 0–0.4)
EOSINOPHIL NFR BLD AUTO: 2 %
ERYTHROCYTE [DISTWIDTH] IN BLOOD BY AUTOMATED COUNT: 13.1 % (ref 11.7–15.4)
GLOBULIN SER-MCNC: 2.3 G/DL (ref 1.5–4.5)
GLUCOSE SERPL-MCNC: 88 MG/DL (ref 70–99)
HBA1C MFR BLD: 5.9 % (ref 4.8–5.6)
HCT VFR BLD AUTO: 43.6 % (ref 34–46.6)
HDLC SERPL-MCNC: 52 MG/DL
HGB BLD-MCNC: 14.5 G/DL (ref 11.1–15.9)
IMM GRANULOCYTES # BLD: 0 X10E3/UL (ref 0–0.1)
IMM GRANULOCYTES NFR BLD: 0 %
LDLC SERPL CALC-MCNC: 215 MG/DL (ref 0–99)
LIPASE SERPL-CCNC: 24 U/L (ref 14–72)
LYMPHOCYTES # BLD AUTO: 2 X10E3/UL (ref 0.7–3.1)
LYMPHOCYTES NFR BLD AUTO: 32 %
MAGNESIUM SERPL-MCNC: 2.3 MG/DL (ref 1.6–2.3)
MCH RBC QN AUTO: 29 PG (ref 26.6–33)
MCHC RBC AUTO-ENTMCNC: 33.3 G/DL (ref 31.5–35.7)
MCV RBC AUTO: 87 FL (ref 79–97)
MICRODELETION SYND BLD/T FISH: NORMAL
MONOCYTES # BLD AUTO: 0.4 X10E3/UL (ref 0.1–0.9)
MONOCYTES NFR BLD AUTO: 7 %
NEUTROPHILS # BLD AUTO: 3.6 X10E3/UL (ref 1.4–7)
NEUTROPHILS NFR BLD AUTO: 59 %
PLATELET # BLD AUTO: 260 X10E3/UL (ref 150–450)
POTASSIUM SERPL-SCNC: 4.2 MMOL/L (ref 3.5–5.2)
PROT SERPL-MCNC: 6.6 G/DL (ref 6–8.5)
RBC # BLD AUTO: 5 X10E6/UL (ref 3.77–5.28)
SODIUM SERPL-SCNC: 139 MMOL/L (ref 134–144)
TRIGL SERPL-MCNC: 314 MG/DL (ref 0–149)
TSH SERPL DL<=0.005 MIU/L-ACNC: 1.87 UIU/ML (ref 0.45–4.5)
WBC # BLD AUTO: 6.1 X10E3/UL (ref 3.4–10.8)

## 2023-09-02 DIAGNOSIS — E78.2 MIXED HYPERLIPIDEMIA: Primary | ICD-10-CM

## 2023-09-02 RX ORDER — ROSUVASTATIN CALCIUM 5 MG/1
5 TABLET, COATED ORAL DAILY
Qty: 90 TABLET | Refills: 3 | Status: SHIPPED | OUTPATIENT
Start: 2023-09-02

## 2023-09-08 NOTE — TELEPHONE ENCOUNTER
As a follow-up, a second attempt has been made for outreach via fax to facility. Please see Contacts section for details.     Thank you  Bandar Steel MA

## 2023-09-25 DIAGNOSIS — M54.12 CERVICAL RADICULOPATHY: ICD-10-CM

## 2023-09-26 RX ORDER — PREDNISONE 10 MG/1
TABLET ORAL
Qty: 30 TABLET | Refills: 1 | Status: SHIPPED | OUTPATIENT
Start: 2023-09-26

## 2023-09-26 NOTE — TELEPHONE ENCOUNTER
As a final attempt, a third outreach has been made via fax to facility. Please see Contacts section for details. This encounter will be closed and completed by end of day. Should we receive the requested information because of previous outreach attempts, the requested patient's chart will be updated appropriately.      Thank you  Renetta Valdovinos MA

## 2023-09-29 DIAGNOSIS — I10 ESSENTIAL HYPERTENSION: ICD-10-CM

## 2023-09-29 RX ORDER — TRIAMTERENE AND HYDROCHLOROTHIAZIDE 37.5; 25 MG/1; MG/1
CAPSULE ORAL
Qty: 90 CAPSULE | Refills: 1 | Status: SHIPPED | OUTPATIENT
Start: 2023-09-29

## 2023-10-14 PROBLEM — Z13.0 SCREENING FOR DEFICIENCY ANEMIA: Status: RESOLVED | Noted: 2020-06-28 | Resolved: 2023-10-14

## 2023-12-28 DIAGNOSIS — I10 ESSENTIAL HYPERTENSION: ICD-10-CM

## 2023-12-28 RX ORDER — METOPROLOL SUCCINATE 25 MG/1
25 TABLET, EXTENDED RELEASE ORAL DAILY
Qty: 90 TABLET | Refills: 1 | Status: SHIPPED | OUTPATIENT
Start: 2023-12-28

## 2024-02-21 PROBLEM — J18.9 PNEUMONIA, UNSPECIFIED ORGANISM: Status: RESOLVED | Noted: 2019-12-02 | Resolved: 2024-02-21

## 2024-03-31 DIAGNOSIS — I10 ESSENTIAL HYPERTENSION: ICD-10-CM

## 2024-04-01 RX ORDER — TRIAMTERENE AND HYDROCHLOROTHIAZIDE 37.5; 25 MG/1; MG/1
CAPSULE ORAL
Qty: 90 CAPSULE | Refills: 1 | Status: SHIPPED | OUTPATIENT
Start: 2024-04-01

## 2024-05-03 DIAGNOSIS — E78.2 MIXED HYPERLIPIDEMIA: ICD-10-CM

## 2024-05-05 RX ORDER — ROSUVASTATIN CALCIUM 5 MG/1
5 TABLET, COATED ORAL DAILY
Qty: 90 TABLET | Refills: 3 | Status: SHIPPED | OUTPATIENT
Start: 2024-05-05

## 2024-09-30 DIAGNOSIS — I10 ESSENTIAL HYPERTENSION: ICD-10-CM

## 2024-09-30 RX ORDER — TRIAMTERENE AND HYDROCHLOROTHIAZIDE 37.5; 25 MG/1; MG/1
CAPSULE ORAL
Qty: 90 CAPSULE | Refills: 1 | Status: SHIPPED | OUTPATIENT
Start: 2024-09-30

## 2024-10-07 DIAGNOSIS — I10 ESSENTIAL HYPERTENSION: ICD-10-CM

## 2024-10-09 RX ORDER — METOPROLOL SUCCINATE 25 MG/1
25 TABLET, EXTENDED RELEASE ORAL DAILY
Qty: 90 TABLET | Refills: 1 | Status: SHIPPED | OUTPATIENT
Start: 2024-10-09

## 2024-10-09 NOTE — TELEPHONE ENCOUNTER
Refill must be reviewed and completed by the office or provider. The refill is unable to be approved or denied by the medication management team.    Patient not seen > 1 year

## 2025-04-06 DIAGNOSIS — I10 ESSENTIAL HYPERTENSION: ICD-10-CM

## 2025-04-08 RX ORDER — METOPROLOL SUCCINATE 25 MG/1
25 TABLET, EXTENDED RELEASE ORAL DAILY
Qty: 90 TABLET | Refills: 1 | Status: SHIPPED | OUTPATIENT
Start: 2025-04-08
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
## 2025-05-09 RX ORDER — DOXYCYCLINE HYCLATE 100 MG
100 TABLET ORAL 2 TIMES DAILY
Qty: 4 TABLET | Refills: 0 | Status: SHIPPED | OUTPATIENT
Start: 2025-05-09 | End: 2025-05-11

## 2025-07-08 DIAGNOSIS — Z13.1 SCREENING FOR DIABETES MELLITUS: ICD-10-CM

## 2025-07-08 DIAGNOSIS — Z13.29 SCREENING FOR THYROID DISORDER: ICD-10-CM

## 2025-07-08 DIAGNOSIS — Z13.21 ENCOUNTER FOR VITAMIN DEFICIENCY SCREENING: ICD-10-CM

## 2025-07-08 DIAGNOSIS — Z13.0 SCREENING FOR DEFICIENCY ANEMIA: ICD-10-CM

## 2025-07-08 DIAGNOSIS — Z00.00 PHYSICAL EXAM: ICD-10-CM

## 2025-07-08 DIAGNOSIS — E78.2 MIXED HYPERLIPIDEMIA: ICD-10-CM

## 2025-07-08 DIAGNOSIS — I10 ESSENTIAL HYPERTENSION: Primary | ICD-10-CM

## 2025-07-09 ENCOUNTER — TELEPHONE (OUTPATIENT)
Dept: FAMILY MEDICINE CLINIC | Facility: CLINIC | Age: 60
End: 2025-07-09

## 2025-07-09 NOTE — TELEPHONE ENCOUNTER
----- Message from Maria Elena Grimes MD sent at 7/8/2025  7:47 PM EDT -----  Please schedule physical exam--thanks

## 2025-07-22 ENCOUNTER — OFFICE VISIT (OUTPATIENT)
Dept: FAMILY MEDICINE CLINIC | Facility: CLINIC | Age: 60
End: 2025-07-22
Payer: COMMERCIAL

## 2025-07-22 VITALS
TEMPERATURE: 97 F | SYSTOLIC BLOOD PRESSURE: 122 MMHG | OXYGEN SATURATION: 96 % | HEIGHT: 63 IN | DIASTOLIC BLOOD PRESSURE: 80 MMHG | HEART RATE: 66 BPM | WEIGHT: 198.8 LBS | RESPIRATION RATE: 18 BRPM | BODY MASS INDEX: 35.22 KG/M2

## 2025-07-22 DIAGNOSIS — E78.2 MIXED HYPERLIPIDEMIA: ICD-10-CM

## 2025-07-22 DIAGNOSIS — Z00.00 ANNUAL PHYSICAL EXAM: Primary | ICD-10-CM

## 2025-07-22 DIAGNOSIS — R10.2 PELVIC CRAMPING: ICD-10-CM

## 2025-07-22 DIAGNOSIS — R92.30 DENSE BREAST TISSUE ON MAMMOGRAM, UNSPECIFIED TYPE: ICD-10-CM

## 2025-07-22 DIAGNOSIS — Z12.31 ENCOUNTER FOR SCREENING MAMMOGRAM FOR BREAST CANCER: ICD-10-CM

## 2025-07-22 DIAGNOSIS — I10 ESSENTIAL HYPERTENSION: ICD-10-CM

## 2025-07-22 LAB
25(OH)D3+25(OH)D2 SERPL-MCNC: 37 NG/ML (ref 30–100)
ALBUMIN SERPL-MCNC: 4.3 G/DL (ref 3.8–4.9)
ALP SERPL-CCNC: 52 IU/L (ref 44–121)
ALT SERPL-CCNC: 23 IU/L (ref 0–32)
AST SERPL-CCNC: 23 IU/L (ref 0–40)
BILIRUB SERPL-MCNC: 0.3 MG/DL (ref 0–1.2)
BUN SERPL-MCNC: 20 MG/DL (ref 6–24)
BUN/CREAT SERPL: 22 (ref 9–23)
CALCIUM SERPL-MCNC: 9.4 MG/DL (ref 8.7–10.2)
CHLORIDE SERPL-SCNC: 99 MMOL/L (ref 96–106)
CHOLEST SERPL-MCNC: 280 MG/DL (ref 100–199)
CO2 SERPL-SCNC: 22 MMOL/L (ref 20–29)
CREAT SERPL-MCNC: 0.89 MG/DL (ref 0.57–1)
EGFR: 75 ML/MIN/1.73
ERYTHROCYTE [DISTWIDTH] IN BLOOD BY AUTOMATED COUNT: 12.8 % (ref 11.7–15.4)
EST. AVERAGE GLUCOSE BLD GHB EST-MCNC: 114 MG/DL
GLOBULIN SER-MCNC: 2.4 G/DL (ref 1.5–4.5)
GLUCOSE SERPL-MCNC: 95 MG/DL (ref 70–99)
HBA1C MFR BLD: 5.6 % (ref 4.8–5.6)
HCT VFR BLD AUTO: 45 % (ref 34–46.6)
HDLC SERPL-MCNC: 51 MG/DL
HGB BLD-MCNC: 14.8 G/DL (ref 11.1–15.9)
LDL CALC COMMENT: ABNORMAL
LDLC SERPL CALC-MCNC: 194 MG/DL (ref 0–99)
LDLC/HDLC SERPL: 3.8 RATIO (ref 0–3.2)
LIPASE SERPL-CCNC: 22 U/L (ref 14–72)
MAGNESIUM SERPL-MCNC: 2.1 MG/DL (ref 1.6–2.3)
MCH RBC QN AUTO: 29.2 PG (ref 26.6–33)
MCHC RBC AUTO-ENTMCNC: 32.9 G/DL (ref 31.5–35.7)
MCV RBC AUTO: 89 FL (ref 79–97)
MICRODELETION SYND BLD/T FISH: NORMAL
PLATELET # BLD AUTO: 242 X10E3/UL (ref 150–450)
POTASSIUM SERPL-SCNC: 3.9 MMOL/L (ref 3.5–5.2)
PROT SERPL-MCNC: 6.7 G/DL (ref 6–8.5)
RBC # BLD AUTO: 5.07 X10E6/UL (ref 3.77–5.28)
SL AMB  POCT GLUCOSE, UA: NORMAL
SL AMB LEUKOCYTE ESTERASE,UA: 500
SL AMB POCT BILIRUBIN,UA: NORMAL
SL AMB POCT BLOOD,UA: NORMAL
SL AMB POCT CLARITY,UA: CLEAR
SL AMB POCT COLOR,UA: YELLOW
SL AMB POCT KETONES,UA: NORMAL
SL AMB POCT NITRITE,UA: NORMAL
SL AMB POCT PH,UA: 6
SL AMB POCT SPECIFIC GRAVITY,UA: 1.01
SL AMB POCT URINE PROTEIN: NORMAL
SL AMB POCT UROBILINOGEN: NORMAL
SL AMB VLDL CHOLESTEROL CALC: 35 MG/DL (ref 5–40)
SODIUM SERPL-SCNC: 139 MMOL/L (ref 134–144)
TRIGL SERPL-MCNC: 185 MG/DL (ref 0–149)
TSH SERPL DL<=0.005 MIU/L-ACNC: 3.12 UIU/ML (ref 0.45–4.5)
WBC # BLD AUTO: 5.5 X10E3/UL (ref 3.4–10.8)

## 2025-07-22 PROCEDURE — 99396 PREV VISIT EST AGE 40-64: CPT | Performed by: FAMILY MEDICINE

## 2025-07-22 PROCEDURE — 81002 URINALYSIS NONAUTO W/O SCOPE: CPT | Performed by: FAMILY MEDICINE

## 2025-07-22 RX ORDER — SULFAMETHOXAZOLE AND TRIMETHOPRIM 800; 160 MG/1; MG/1
1 TABLET ORAL 2 TIMES DAILY
Qty: 6 TABLET | Refills: 0 | Status: SHIPPED | OUTPATIENT
Start: 2025-07-22 | End: 2025-07-25

## 2025-07-25 LAB
BACTERIA UR CULT: ABNORMAL
Lab: ABNORMAL